# Patient Record
Sex: FEMALE | Race: WHITE | Employment: FULL TIME | URBAN - METROPOLITAN AREA
[De-identification: names, ages, dates, MRNs, and addresses within clinical notes are randomized per-mention and may not be internally consistent; named-entity substitution may affect disease eponyms.]

---

## 2017-03-21 ENCOUNTER — HOSPITAL ENCOUNTER (OUTPATIENT)
Age: 47
Discharge: HOME | End: 2017-03-21
Attending: EMERGENCY MEDICINE
Payer: COMMERCIAL

## 2017-03-21 VITALS
BODY MASS INDEX: 24 KG/M2 | RESPIRATION RATE: 18 BRPM | OXYGEN SATURATION: 99 % | TEMPERATURE: 98 F | HEART RATE: 71 BPM | WEIGHT: 136 LBS | SYSTOLIC BLOOD PRESSURE: 125 MMHG | DIASTOLIC BLOOD PRESSURE: 75 MMHG

## 2017-03-21 DIAGNOSIS — L03.019 ONYCHIA AND PARONYCHIA OF FINGER: ICD-10-CM

## 2017-03-21 DIAGNOSIS — L02.411 ABSCESS OF RIGHT AXILLA: Primary | ICD-10-CM

## 2017-03-21 PROCEDURE — 99204 OFFICE O/P NEW MOD 45 MIN: CPT

## 2017-03-21 PROCEDURE — 87205 SMEAR GRAM STAIN: CPT | Performed by: EMERGENCY MEDICINE

## 2017-03-21 PROCEDURE — 87077 CULTURE AEROBIC IDENTIFY: CPT | Performed by: EMERGENCY MEDICINE

## 2017-03-21 PROCEDURE — 99213 OFFICE O/P EST LOW 20 MIN: CPT

## 2017-03-21 PROCEDURE — 10061 I&D ABSCESS COMP/MULTIPLE: CPT

## 2017-03-21 PROCEDURE — 87070 CULTURE OTHR SPECIMN AEROBIC: CPT | Performed by: EMERGENCY MEDICINE

## 2017-03-21 PROCEDURE — 87186 SC STD MICRODIL/AGAR DIL: CPT | Performed by: EMERGENCY MEDICINE

## 2017-03-21 RX ORDER — CLINDAMYCIN HYDROCHLORIDE 300 MG/1
300 CAPSULE ORAL 4 TIMES DAILY
Qty: 28 CAPSULE | Refills: 0 | Status: SHIPPED | OUTPATIENT
Start: 2017-03-21 | End: 2017-03-28

## 2017-03-21 NOTE — ED INITIAL ASSESSMENT (HPI)
Pt with abscess under right arm. States present for one week but becoming more painful and increasing in size since yesterday. Denies fever. Pt with area of inflammation to index finger left hand.  States noticed yesterday

## 2017-03-21 NOTE — ED PROVIDER NOTES
Patient Seen in: [facility] In [address] [place] [address]    History   Patient presents with:  Abscess (integumentary)    Stated Complaint: abcess    HPI    38-year-old female without significant past medical history presents with complaints of incre lungs are clear to auscultation  Cardiac: regular rate and rhythm  Gastrointestinal: abdomen is soft and non tender, no masses, bowel sounds normal  Musculoskeletal: neck is supple and non tender         Extremities have full range of motion and are non te Medication List    START taking these medications    Clindamycin HCl 300 MG Oral Cap  Take 1 capsule (300 mg total) by mouth 4 (four) times daily.   Qty: 28 capsule Refills: 0

## 2017-03-23 ENCOUNTER — OFFICE VISIT (OUTPATIENT)
Dept: FAMILY MEDICINE CLINIC | Facility: CLINIC | Age: 47
End: 2017-03-23

## 2017-03-23 VITALS
DIASTOLIC BLOOD PRESSURE: 68 MMHG | WEIGHT: 134 LBS | HEIGHT: 63.5 IN | SYSTOLIC BLOOD PRESSURE: 109 MMHG | BODY MASS INDEX: 23.45 KG/M2 | TEMPERATURE: 98 F | HEART RATE: 74 BPM

## 2017-03-23 DIAGNOSIS — R32 URINARY INCONTINENCE, UNSPECIFIED TYPE: ICD-10-CM

## 2017-03-23 DIAGNOSIS — L02.411 ABSCESS OF RIGHT AXILLA: Primary | ICD-10-CM

## 2017-03-23 DIAGNOSIS — Z12.4 CERVICAL CANCER SCREENING: ICD-10-CM

## 2017-03-23 DIAGNOSIS — L03.012 PARONYCHIA OF LEFT INDEX FINGER: ICD-10-CM

## 2017-03-23 PROCEDURE — 99213 OFFICE O/P EST LOW 20 MIN: CPT | Performed by: FAMILY MEDICINE

## 2017-03-23 PROCEDURE — 99212 OFFICE O/P EST SF 10 MIN: CPT | Performed by: FAMILY MEDICINE

## 2017-03-23 NOTE — PROGRESS NOTES
Patient ID: Ryley Hurtado is a 48year old female.         ED Provider Notes by Perla Leiva MD at 3/21/2017  2:49 PM      Author: Perla Leiva MD Service: (none) Author Type: Physician     Filed: 3/21/2017  2:52 PM Note Time: 3/21/2017  2:49 P 111/73  07/30/12 : 112/74        Review of Systems   Constitutional: Negative for fever. Gastrointestinal: Negative for abdominal pain. Genitourinary: Negative for dysuria, urgency, frequency and hematuria.          Past Medical History   Diagnosis Date

## 2017-04-04 ENCOUNTER — OFFICE VISIT (OUTPATIENT)
Dept: FAMILY MEDICINE CLINIC | Facility: CLINIC | Age: 47
End: 2017-04-04

## 2017-04-04 VITALS
BODY MASS INDEX: 23.92 KG/M2 | HEIGHT: 63 IN | DIASTOLIC BLOOD PRESSURE: 71 MMHG | WEIGHT: 135 LBS | SYSTOLIC BLOOD PRESSURE: 107 MMHG | HEART RATE: 66 BPM

## 2017-04-04 DIAGNOSIS — Z12.31 VISIT FOR SCREENING MAMMOGRAM: ICD-10-CM

## 2017-04-04 DIAGNOSIS — Z00.00 ADULT GENERAL MEDICAL EXAM: Primary | ICD-10-CM

## 2017-04-04 DIAGNOSIS — D17.9 MULTIPLE LIPOMAS: ICD-10-CM

## 2017-04-04 PROCEDURE — 99396 PREV VISIT EST AGE 40-64: CPT | Performed by: FAMILY MEDICINE

## 2017-04-04 NOTE — PROGRESS NOTES
Patient ID: James Reveles is a 48year old female. HPI  Patient presents with:  Routine Physical    She does not smoke. She is . She works at [employer]. She has an appointment with her gynecologist on April 12, 2017.   Otherwise she states s Occupational History  None on file     Social History Main Topics   Smoking status: Never Smoker     Smokeless tobacco: Never Used    Alcohol Use: No    Drug Use: No    Sexual Activity: Not on file   Not on file  Other Topics Concern    Caffeine Concer lipomas  She has no pain with these and just wanted to mention these. Visit for screening mammogram  -     Mammo Screening BILATERAL; Future        Follow up if symptoms persist.  Take medicine (if given) as prescribed.   Approach to treatment discussed an

## 2017-04-10 ENCOUNTER — LAB ENCOUNTER (OUTPATIENT)
Dept: LAB | Age: 47
End: 2017-04-10
Attending: FAMILY MEDICINE
Payer: COMMERCIAL

## 2017-04-10 ENCOUNTER — HOSPITAL ENCOUNTER (OUTPATIENT)
Dept: MAMMOGRAPHY | Age: 47
Discharge: HOME | End: 2017-04-10
Attending: FAMILY MEDICINE
Payer: COMMERCIAL

## 2017-04-10 DIAGNOSIS — Z12.31 VISIT FOR SCREENING MAMMOGRAM: ICD-10-CM

## 2017-04-10 DIAGNOSIS — Z00.00 ADULT GENERAL MEDICAL EXAM: ICD-10-CM

## 2017-04-10 PROCEDURE — 80053 COMPREHEN METABOLIC PANEL: CPT

## 2017-04-10 PROCEDURE — 77067 SCR MAMMO BI INCL CAD: CPT

## 2017-04-10 PROCEDURE — 36415 COLL VENOUS BLD VENIPUNCTURE: CPT

## 2017-04-10 PROCEDURE — 80061 LIPID PANEL: CPT

## 2017-04-10 PROCEDURE — 85025 COMPLETE CBC W/AUTO DIFF WBC: CPT

## 2017-04-10 PROCEDURE — 84443 ASSAY THYROID STIM HORMONE: CPT

## 2017-04-12 ENCOUNTER — TELEPHONE (OUTPATIENT)
Dept: FAMILY MEDICINE CLINIC | Facility: CLINIC | Age: 47
End: 2017-04-12

## 2017-04-12 NOTE — TELEPHONE ENCOUNTER
Spoke with patient, results given, pt verbalized understanding . Copy of results mailed to home address.

## 2017-04-12 NOTE — TELEPHONE ENCOUNTER
----- Message from Wilfrid Garrido DO sent at 4/10/2017  7:49 PM CDT -----  Mammogram read as benign. My nurse will send you the report.

## 2017-04-14 NOTE — ED NOTES
Pt is calm. Pt states, \"I have good  News my son is being released in an hour\". Pt is tearful. Pt is cooperative. Ordered pt a meal tray. Pt resting in bed. Security at bedside. Will continue to monitor.

## 2017-04-14 NOTE — ED INITIAL ASSESSMENT (HPI)
Pt was picked up by her  in the car. They drove to her husbands work and the police were waiting to talk to them. Her son was arrested for selling marijuana. Pt had said she did not want to live anymore. Is here for evaluation.  Pt scratched her ante

## 2017-04-14 NOTE — BH PROGRESS NOTE
Assessed pt for safety in ED. According to police, pt become so upset what her son got in trouble with the law for marijuana that she thought that her \"life was over.  \" Pt reported that she did not know that they would talker so seriously and expressed r

## 2017-04-14 NOTE — BH LEVEL OF CARE ASSESSMENT
Level of Care Assessment Note        General Questions  Why are you here?: Pt is crying on the phone and unwilling to get off the phone. Pt is from [facility] and her accent is very thick.  She is reapeatedly saying \" all my life my lie, I feel like I am dyin By[de-identified] n/a  Past Suicidal Ideation: No  Past Suicide Plan: No  Past Suicide Intent: No  Past Suicide Rehearsal: No  Past Suicide Attempt: No  Past Suicide Risk Mitigating Factors: no hx of depression                                                            and she came here for the \"doctors\" since her son was sick and needed medical attentooon.  Today her son was arresed for having and possibly Awilda Miguel Ángel she broke down and was upset and started hysterically crying and commenting  that her life is

## 2017-04-14 NOTE — ED PROVIDER NOTES
Patient Seen in: [facility]    History   Patient presents with:  Eval-P (psychiatric)    Stated Complaint: psych eval    HPI    Patient is a 47-year-old female with no significant past medical history presents now with the abo 04/04/2017 (Approximate)        Physical Exam    Constitutional: Well-developed well-nourished in no acute distress  Head: Normocephalic, no swelling or tenderness  Eyes: Nonicteric sclera, no conjunctival injection  ENT: TMs are clear and flat bilaterally result                 Please view results for these tests on the individual orders.    RAINBOW DRAW BLUE   RAINBOW DRAW GOLD   RAINBOW DRAW LAVENDER   RAINBOW DRAW LIGHT GREEN   RAINBOW DRAW DARK GREEN   RAINBOW DRAW LAVENDER TALL (BNP)   CBC W/ DIFFERENTI

## 2017-04-14 NOTE — ED NOTES
Pt informed will be d/c home. Security provided belongings to patient. Pt left ER before d/c instructions could be given.

## 2017-07-20 ENCOUNTER — OFFICE VISIT (OUTPATIENT)
Dept: OBGYN CLINIC | Facility: CLINIC | Age: 47
End: 2017-07-20

## 2017-07-20 VITALS
HEART RATE: 88 BPM | BODY MASS INDEX: 21 KG/M2 | WEIGHT: 127 LBS | DIASTOLIC BLOOD PRESSURE: 62 MMHG | SYSTOLIC BLOOD PRESSURE: 94 MMHG

## 2017-07-20 DIAGNOSIS — Z01.411 ENCOUNTER FOR GYNECOLOGICAL EXAMINATION WITH ABNORMAL FINDING: Primary | ICD-10-CM

## 2017-07-20 DIAGNOSIS — R32 URINARY INCONTINENCE, UNSPECIFIED TYPE: ICD-10-CM

## 2017-07-20 PROCEDURE — 99212 OFFICE O/P EST SF 10 MIN: CPT | Performed by: OBSTETRICS & GYNECOLOGY

## 2017-07-20 PROCEDURE — 99386 PREV VISIT NEW AGE 40-64: CPT | Performed by: OBSTETRICS & GYNECOLOGY

## 2017-07-20 NOTE — PATIENT INSTRUCTIONS
Kegel Exercises  Kegel exercises don’t need special clothing or equipment. They’re easy to learn and simple to do. And if you do them right, no one can tell you’re doing them, so they can be done almost anywhere.  Your healthcare provider, nurse, or physi · Tighten your pelvic floor before you sneeze, get up from a chair, cough, laugh, or lift. This protects your pelvic floor from injury and can help prevent urine leakage.    Date Last Reviewed: 8/5/2015 © 2000-2016 The [address] [place] [address]

## 2017-07-20 NOTE — PROGRESS NOTES
Carlos Hayden is a 50year old female [id number] Patient's last menstrual period was 07/07/2017. Patient presents with:  Gyn Problem: URINARY INCONTINENCE per Dr. Bev Martinez --  never had pap. When walks, leaks urine -- issue in last 6 months.   Leaks w/ sneez denies shortness of breath  Gastrointestinal:  denies heartburn, abdominal pain, diarrhea or constipation  Genitourinary:  denies dysuria, incontinence, abnormal vaginal discharge, vaginal itching  Musculoskeletal:  denies back pain.   Skin/Breast:  Denies more anatomical issue like ?  Divert -- see urology for eval.

## 2017-07-22 LAB — HPV I/H RISK 1 DNA SPEC QL NAA+PROBE: NEGATIVE

## 2018-01-10 ENCOUNTER — HOSPITAL ENCOUNTER (OUTPATIENT)
Age: 48
Discharge: STILL ADMITTED OR ADMINISTRATIVE | End: 2018-01-10
Payer: COMMERCIAL

## 2018-01-11 NOTE — ED NOTES
Patient is requesting MD to bypass patients and see her now because she is on her lunch break.    Informed patient that due to census and single MD on site, discharge may not be possible prior to 7pm.  Discussed request with MD.  Provided patient with walk

## 2019-04-30 ENCOUNTER — TELEPHONE (OUTPATIENT)
Dept: OTHER | Age: 49
End: 2019-04-30

## 2019-04-30 ENCOUNTER — OFFICE VISIT (OUTPATIENT)
Dept: FAMILY MEDICINE CLINIC | Facility: CLINIC | Age: 49
End: 2019-04-30
Payer: COMMERCIAL

## 2019-04-30 VITALS
BODY MASS INDEX: 23.21 KG/M2 | DIASTOLIC BLOOD PRESSURE: 71 MMHG | HEART RATE: 104 BPM | SYSTOLIC BLOOD PRESSURE: 103 MMHG | WEIGHT: 131 LBS | TEMPERATURE: 98 F | HEIGHT: 63 IN

## 2019-04-30 DIAGNOSIS — K13.0 LESION OF LIP: ICD-10-CM

## 2019-04-30 DIAGNOSIS — L30.9 DERMATITIS: Primary | ICD-10-CM

## 2019-04-30 DIAGNOSIS — L98.9 NON-HEALING SKIN LESION OF NOSE: ICD-10-CM

## 2019-04-30 PROCEDURE — 99212 OFFICE O/P EST SF 10 MIN: CPT | Performed by: FAMILY MEDICINE

## 2019-04-30 PROCEDURE — 99214 OFFICE O/P EST MOD 30 MIN: CPT | Performed by: FAMILY MEDICINE

## 2019-04-30 RX ORDER — MUPIROCIN CALCIUM 20 MG/G
1 CREAM TOPICAL DAILY
Qty: 15 G | Refills: 0 | Status: SHIPPED | OUTPATIENT
Start: 2019-04-30 | End: 2019-05-14

## 2019-04-30 NOTE — TELEPHONE ENCOUNTER
Pt called and she stated that she speaks [place] language. With a  #[id number]. Pt stated that for the past 4 month she has been having some chap lips and itching and also on the chin. Pt saw the specialist and that doctor did not help her.  Only cl

## 2019-04-30 NOTE — PROGRESS NOTES
Patient ID: Chikis Silva is a 44year old female. HPI  Patient presents with:  Redness: on chin,lips and nose  Itching: chin,lips and nose  Does not smoke. Redness on the chin started January.  She reports that a  week ago the redness and drynes Neurological: Negative for speech difficulty. Psychiatric/Behavioral: The patient is not nervous/anxious.           Past Medical History:   Diagnosis Date   • Asthma due to environmental allergies        Past Surgical History:   Procedure Laterality Mal 14 days. Dermatology department was nice enough to get back to me to state they would give her a call to get her in sooner.   In the meantime we will start her on Bactroban cream.  Non-healing skin lesion of nose  -     Mupirocin Calcium 2 % External Cream

## 2019-05-08 ENCOUNTER — OFFICE VISIT (OUTPATIENT)
Dept: DERMATOLOGY CLINIC | Facility: CLINIC | Age: 49
End: 2019-05-08
Payer: COMMERCIAL

## 2019-05-08 DIAGNOSIS — L01.00 IMPETIGO: Primary | ICD-10-CM

## 2019-05-08 DIAGNOSIS — K13.0 LIP LESION: ICD-10-CM

## 2019-05-08 DIAGNOSIS — D23.9 BENIGN NEOPLASM OF SKIN, UNSPECIFIED LOCATION: ICD-10-CM

## 2019-05-08 PROCEDURE — 99212 OFFICE O/P EST SF 10 MIN: CPT | Performed by: DERMATOLOGY

## 2019-05-08 PROCEDURE — 99203 OFFICE O/P NEW LOW 30 MIN: CPT | Performed by: DERMATOLOGY

## 2019-05-08 RX ORDER — DOXYCYCLINE HYCLATE 50 MG/1
50 CAPSULE ORAL 2 TIMES DAILY
Qty: 60 CAPSULE | Refills: 12 | Status: SHIPPED | OUTPATIENT
Start: 2019-05-08 | End: 2020-04-28

## 2019-05-08 RX ORDER — VALACYCLOVIR HYDROCHLORIDE 1 G/1
TABLET, FILM COATED ORAL
Qty: 60 TABLET | Refills: 12 | Status: SHIPPED | OUTPATIENT
Start: 2019-05-08 | End: 2020-04-28

## 2019-05-19 NOTE — PROGRESS NOTES
Tenna Hodgkin is a 44year old female. HPI:     CC:  Patient presents with:  Lesion: New pt presenitng with lesion and redness to nose. c/o itching to nose. Pt denies personal and family hx of skin cancer.    Lesion: Pt also concerned with hyperpigment Days per week: Not on file        Minutes per session: Not on file      Stress: Not on file    Relationships      Social connections:        Talks on phone: Not on file        Gets together: Not on file        Attends Mosque service: Not on file inflamed. No particular seasonal variation currently using mupirocin help slightly. Has left discoloration on the lip. Notes more persistent redness on the nose. Has taken Xyzal without improvement.   Mupirocin helps once lesions are there does not help scarring. Inside lip superficial erosions resolving.   Nose with more diffuse redness few papular nodules, crusting yellowish cluster of erythematous herpetiform papules at ala    Assessment plan     probable impetiginized area nose inside at nares and lip

## 2019-07-03 ENCOUNTER — OFFICE VISIT (OUTPATIENT)
Dept: FAMILY MEDICINE CLINIC | Facility: CLINIC | Age: 49
End: 2019-07-03
Payer: COMMERCIAL

## 2019-07-03 VITALS
WEIGHT: 129 LBS | HEART RATE: 67 BPM | BODY MASS INDEX: 22.86 KG/M2 | SYSTOLIC BLOOD PRESSURE: 119 MMHG | DIASTOLIC BLOOD PRESSURE: 73 MMHG | HEIGHT: 63 IN

## 2019-07-03 DIAGNOSIS — N92.4 EXCESSIVE BLEEDING IN PREMENOPAUSAL PERIOD: ICD-10-CM

## 2019-07-03 DIAGNOSIS — J30.1 SEASONAL ALLERGIC RHINITIS DUE TO POLLEN: ICD-10-CM

## 2019-07-03 DIAGNOSIS — N95.1 PERIMENOPAUSE: ICD-10-CM

## 2019-07-03 DIAGNOSIS — N39.45 CONTINUOUS LEAKAGE OF URINE: Primary | ICD-10-CM

## 2019-07-03 DIAGNOSIS — Z00.00 WELL ADULT EXAM: ICD-10-CM

## 2019-07-03 DIAGNOSIS — Z12.11 SCREENING FOR COLON CANCER: ICD-10-CM

## 2019-07-03 DIAGNOSIS — Z12.31 SCREENING MAMMOGRAM, ENCOUNTER FOR: ICD-10-CM

## 2019-07-03 PROCEDURE — 99396 PREV VISIT EST AGE 40-64: CPT | Performed by: NURSE PRACTITIONER

## 2019-07-03 NOTE — PATIENT INSTRUCTIONS
Treating Incontinence in Women: Special Therapies     During biofeedback, sensors send signals from your pelvic floor muscles to a computer screen. Your doctor will discuss your options for treating your urinary incontinence.  These depend on the caus © 4722-4071 The [address]. [address] [facility], [address] [place]. All rights reserved. This information is not intended as a substitute for professional medical care. Always follow your healthcare professional's instructions.         Prevent All women should be familiar with the potential benefits and risks of breast cancer screening with mammograms.      Cervical cancer All women in this age group, except women who have had a complete hysterectomy Pap test every 3 years or Pap test with human Hepatitis A Women at increased risk for infection – talk with your healthcare provider 2 doses given at least 6 months apart   Hepatitis B Women at increased risk for infection – talk with your healthcare provider 3 doses over 6 months; second dose should Use of daily aspirin Women ages 54 and up in this age group who are at risk for cardiovascular health problems such as stroke When your risk is known   Use of tobacco and the health effects it can cause All women in this age group Every exam   1Amerjeremias Ca · Decongestant pills and sprays reduce tissue swelling and watery discharge. Overuse of nasal decongestant sprays may make symptoms worse.  Do not use these more often than recommended. Sometimes you can experience a rebound effect (symptoms worsen), when s · You have asthma and your asthma symptoms do not respond to the usual doses of your medicine  · Cough with colored sputum (mucus)  · Fever of 100.4°F (38°C) or higher, or as directed by the healthcare provider  Call 911  Call 911 if any of these occur:  · · Urge incontinence (also called overactive bladder). With this type, a sudden urge to urinate is felt often. This may happen even though there may not be much urine in the bladder. The need to urinate often during the night is common.  Urge incontinence mo · Quit smoking. Smoking and other tobacco use can lead to chronic cough that strains the pelvic floor muscles. Smoking may also damage the bladder and urethra. Talk with your provider about treatments or methods you can use to quit smoking.   · If drinking Menopause is when you stop having periods for good. For many women, this happens around age 48. The time of change before this is called perimenopause. It may start in your late 30s or 40s. It can last for months or years.  During this time, your body makes · Urinary symptoms including frequency and incontinence  Medicines that may help  Some medicines can help ease the effects of perimenopause. These include:  · Low-dose birth control pills. These often contain both estrogen and progesterone.  They can help r

## 2019-07-03 NOTE — PROGRESS NOTES
HPI  Pt presents with incontinence of urine-constant leaking of urine, soaking pads. Periods are worsening, lasting longer with clots and cramping. Sometimes will get period 2x in a month.      Review of Systems   Constitutional: Positive for activity [place] week: Not on file        Minutes per session: Not on file      Stress: Not on file    Relationships      Social connections:        Talks on phone: Not on file        Gets together: Not on file        Attends Islam service: Not on file        Active me present  Mouth/Throat: Posterior oropharyngeal erythema present. Pale, boggy turbinates bilaterally; clear rhinorrhea present     Eyes: Pupils are equal, round, and reactive to light. Conjunctivae and EOM are normal. Right eye exhibits no discharge.  Left Orders    [facility] TENISHA 2D+3D SCREENING BILAT (CPT=77067/23088)                      Discussed plan of care with pt and pt is in agreement. All questions answered. Pt to call with questions or concerns.     Encouraged to sign up for My Chart if not already regist

## 2020-02-04 ENCOUNTER — OFFICE VISIT (OUTPATIENT)
Dept: OBGYN CLINIC | Facility: CLINIC | Age: 50
End: 2020-02-04
Payer: COMMERCIAL

## 2020-02-04 VITALS
HEART RATE: 72 BPM | DIASTOLIC BLOOD PRESSURE: 70 MMHG | SYSTOLIC BLOOD PRESSURE: 106 MMHG | BODY MASS INDEX: 23 KG/M2 | WEIGHT: 128 LBS

## 2020-02-04 DIAGNOSIS — Z12.31 VISIT FOR SCREENING MAMMOGRAM: ICD-10-CM

## 2020-02-04 DIAGNOSIS — N93.9 ABNORMAL VAGINAL BLEEDING: Primary | ICD-10-CM

## 2020-02-04 PROCEDURE — 99214 OFFICE O/P EST MOD 30 MIN: CPT | Performed by: OBSTETRICS & GYNECOLOGY

## 2020-02-05 NOTE — PROGRESS NOTES
Joshua Romo is a 44year old female [id number] Patient's last menstrual period was 01/20/2020.  Patient presents with:  Menstrual Problem: Whole month of Dec w/ spotting -- normal period then in Jan x 7-8 days -- prior period back in Oct (+) hot flashes Not on file        Gets together: Not on file        Attends Mandaen service: Not on file        Active member of club or organization: Not on file        Attends meetings of clubs or organizations: Not on file        Relationship status: Not on file pain.  Skin/Breast:    denies any breast pain, lumps, or discharge. Neurological:    denies headaches, extremity weakness or numbness. Psychiatric:   denies depression or anxiety. Endocrine:     denies excessive thirst or urination.   Heme/Lymph:    den

## 2020-04-25 ENCOUNTER — NURSE TRIAGE (OUTPATIENT)
Dept: FAMILY MEDICINE CLINIC | Facility: CLINIC | Age: 50
End: 2020-04-25

## 2020-04-25 NOTE — TELEPHONE ENCOUNTER
Action Requested: Summary for Provider     []  Critical Lab, Recommendations Needed  [] Need Additional Advice  []   FYI    []   Need Orders  [] Need Medications Sent to Pharmacy  []  Other     SUMMARY:Pt requesting Appt- c/c head ache, hand pain, body blayne

## 2020-04-25 NOTE — TELEPHONE ENCOUNTER
Pt contacted. Informed of Dr. Vinh Jeter message. She verbalized understanding and schedule appointment for Tuesday morning.     Future Appointments   Date Time Provider Leobardo Ambrocio   4/28/2020  8:40 AM DO DARCI Garcia  JULIOO

## 2020-04-25 NOTE — TELEPHONE ENCOUNTER
She is a nurse. If this headache started 1 month ago I doubt this is COVID 19.   If she wants to be seen and does not have a fever  go ahead and set up for an appointment on Monday or Tuesday but please make sure she can get here before 3 PM on Monday or 2

## 2020-04-28 ENCOUNTER — LAB ENCOUNTER (OUTPATIENT)
Dept: LAB | Age: 50
End: 2020-04-28
Attending: FAMILY MEDICINE
Payer: COMMERCIAL

## 2020-04-28 ENCOUNTER — OFFICE VISIT (OUTPATIENT)
Dept: FAMILY MEDICINE CLINIC | Facility: CLINIC | Age: 50
End: 2020-04-28
Payer: COMMERCIAL

## 2020-04-28 VITALS
HEIGHT: 63 IN | HEART RATE: 73 BPM | DIASTOLIC BLOOD PRESSURE: 71 MMHG | WEIGHT: 127.63 LBS | TEMPERATURE: 99 F | BODY MASS INDEX: 22.61 KG/M2 | SYSTOLIC BLOOD PRESSURE: 104 MMHG

## 2020-04-28 DIAGNOSIS — R51.9 HEADACHE, UNSPECIFIED HEADACHE TYPE: ICD-10-CM

## 2020-04-28 DIAGNOSIS — M79.10 MYALGIA: ICD-10-CM

## 2020-04-28 DIAGNOSIS — R51.9 HEADACHE, UNSPECIFIED HEADACHE TYPE: Primary | ICD-10-CM

## 2020-04-28 DIAGNOSIS — R53.1 GENERALIZED WEAKNESS: ICD-10-CM

## 2020-04-28 PROCEDURE — 84443 ASSAY THYROID STIM HORMONE: CPT

## 2020-04-28 PROCEDURE — 86200 CCP ANTIBODY: CPT

## 2020-04-28 PROCEDURE — 82607 VITAMIN B-12: CPT

## 2020-04-28 PROCEDURE — 85025 COMPLETE CBC W/AUTO DIFF WBC: CPT

## 2020-04-28 PROCEDURE — 99214 OFFICE O/P EST MOD 30 MIN: CPT | Performed by: FAMILY MEDICINE

## 2020-04-28 PROCEDURE — 85652 RBC SED RATE AUTOMATED: CPT

## 2020-04-28 PROCEDURE — 84165 PROTEIN E-PHORESIS SERUM: CPT

## 2020-04-28 PROCEDURE — 80053 COMPREHEN METABOLIC PANEL: CPT

## 2020-04-28 PROCEDURE — 86235 NUCLEAR ANTIGEN ANTIBODY: CPT

## 2020-04-28 PROCEDURE — 86225 DNA ANTIBODY NATIVE: CPT

## 2020-04-28 PROCEDURE — 86160 COMPLEMENT ANTIGEN: CPT

## 2020-04-28 PROCEDURE — 86140 C-REACTIVE PROTEIN: CPT

## 2020-04-28 PROCEDURE — 36415 COLL VENOUS BLD VENIPUNCTURE: CPT

## 2020-04-28 PROCEDURE — 86431 RHEUMATOID FACTOR QUANT: CPT

## 2020-04-28 NOTE — PROGRESS NOTES
Patient ID: Susanne Crawley is a 44year old female.     HPI  Patient presents with:  Headache: x 1 month  Body ache and/or chills: body ache x 1 month    Telephone message from 4/25/20:  Randy Cuevas RN   Registered Nurse      Telephone Encounter   Si coughing. She does not feel sick. She does not feel her pain is due to her work. She was working 2 jobs initially and states her current workload is lighter as she is only working one job. Her mother does not have rheumatoid arthritis.   Her family is fr Medications   Medication Sig Dispense Refill   • Cetirizine HCl (ZYRTEC ALLERGY) 10 MG Oral Cap Take by mouth.        Allergies:No Known Allergies     Physical Exam:       Physical Exam   Physical Exam   Constitutional: Patient is oriented to person, place, THYROID STIM HORMONE; Future  -     VITAMIN B12; Future  Speaking clearly. No neurologic deficits at all. No synovitis of the musculature. Lets go ahead and do labs.   I offered her medication for discomfort but she defers and states she would rather jus Billie Rodrigues DO. Electronically Signed: Ellen Ruiz, 4/28/2020, 8:54 AM.    I, Billie Rodrigues DO,  personally performed the services described in this documentation.  All medical record entries made by the scribe were at my direction and in my prese

## 2020-05-12 ENCOUNTER — LAB ENCOUNTER (OUTPATIENT)
Dept: LAB | Age: 50
End: 2020-05-12
Attending: FAMILY MEDICINE
Payer: COMMERCIAL

## 2020-05-12 DIAGNOSIS — D72.819 LEUKOPENIA, UNSPECIFIED TYPE: ICD-10-CM

## 2020-05-12 PROCEDURE — 85025 COMPLETE CBC W/AUTO DIFF WBC: CPT

## 2020-05-12 PROCEDURE — 36415 COLL VENOUS BLD VENIPUNCTURE: CPT

## 2020-05-19 ENCOUNTER — LAB ENCOUNTER (OUTPATIENT)
Dept: LAB | Age: 50
End: 2020-05-19
Attending: FAMILY MEDICINE
Payer: COMMERCIAL

## 2020-05-19 ENCOUNTER — OFFICE VISIT (OUTPATIENT)
Dept: FAMILY MEDICINE CLINIC | Facility: CLINIC | Age: 50
End: 2020-05-19
Payer: COMMERCIAL

## 2020-05-19 ENCOUNTER — HOSPITAL ENCOUNTER (OUTPATIENT)
Dept: GENERAL RADIOLOGY | Age: 50
Discharge: HOME | End: 2020-05-19
Attending: FAMILY MEDICINE
Payer: COMMERCIAL

## 2020-05-19 VITALS
WEIGHT: 125 LBS | DIASTOLIC BLOOD PRESSURE: 69 MMHG | TEMPERATURE: 97 F | HEIGHT: 63 IN | BODY MASS INDEX: 22.15 KG/M2 | HEART RATE: 64 BPM | SYSTOLIC BLOOD PRESSURE: 101 MMHG

## 2020-05-19 DIAGNOSIS — R53.83 LETHARGY: ICD-10-CM

## 2020-05-19 DIAGNOSIS — M25.521 PAIN OF BOTH ELBOWS: ICD-10-CM

## 2020-05-19 DIAGNOSIS — D72.819 LEUKOPENIA, UNSPECIFIED TYPE: Primary | ICD-10-CM

## 2020-05-19 DIAGNOSIS — M25.522 PAIN OF BOTH ELBOWS: ICD-10-CM

## 2020-05-19 DIAGNOSIS — D72.819 LEUKOPENIA, UNSPECIFIED TYPE: ICD-10-CM

## 2020-05-19 PROCEDURE — 99214 OFFICE O/P EST MOD 30 MIN: CPT | Performed by: FAMILY MEDICINE

## 2020-05-19 PROCEDURE — 85025 COMPLETE CBC W/AUTO DIFF WBC: CPT

## 2020-05-19 PROCEDURE — 71046 X-RAY EXAM CHEST 2 VIEWS: CPT | Performed by: FAMILY MEDICINE

## 2020-05-19 PROCEDURE — 3078F DIAST BP <80 MM HG: CPT | Performed by: FAMILY MEDICINE

## 2020-05-19 PROCEDURE — 86664 EPSTEIN-BARR NUCLEAR ANTIGEN: CPT

## 2020-05-19 PROCEDURE — 86665 EPSTEIN-BARR CAPSID VCA: CPT

## 2020-05-19 PROCEDURE — 36415 COLL VENOUS BLD VENIPUNCTURE: CPT

## 2020-05-19 PROCEDURE — 86308 HETEROPHILE ANTIBODY SCREEN: CPT

## 2020-05-19 PROCEDURE — 3074F SYST BP LT 130 MM HG: CPT | Performed by: FAMILY MEDICINE

## 2020-05-19 PROCEDURE — 3008F BODY MASS INDEX DOCD: CPT | Performed by: FAMILY MEDICINE

## 2020-05-19 NOTE — PROGRESS NOTES
Patient ID: Braden Ruby is a 44year old female. HPI  Patient presents with:  Lab Results: from 5/12/20    Last seen by me on 4/28/20. She states her headaches are much better but she feels very tired.  She has difficulty waking up early and ha ALB 3.3 (L) 04/28/2020    ALB 3.88 04/28/2020    GLOBULIN 3.8 04/28/2020    AGRATIO 1.3 08/06/2012     04/28/2020    K 4.0 04/28/2020     04/28/2020    CO2 25.0 04/28/2020       Lab Results   Component Value Date    GLU 93 04/28/2020    BUN 20 94/62        Review of Systems   Constitutional: Positive for fatigue. Negative for chills and fever. HENT: Negative for voice change. Respiratory: Negative for cough, chest tightness and shortness of breath.     Cardiovascular: Negative for chest pain using her hands to get her up. Vitals reviewed. Blood pressure 101/69, pulse 64, temperature 97.4 °F (36.3 °C), temperature source Oral, height 5' 3\" (1.6 m), weight 125 lb (56.7 kg), not currently breastfeeding.            Assessment/Plan:      Diag Theresa Ramos DO. Electronically Signed: Yue Mendez, 5/19/2020, 10:48 AM.    I, Theresa Ramos DO,  personally performed the services described in this documentation.  All medical record entries made by the scribe were at my direction and in my pres

## 2020-12-22 ENCOUNTER — VIRTUAL PHONE E/M (OUTPATIENT)
Dept: FAMILY MEDICINE CLINIC | Facility: CLINIC | Age: 50
End: 2020-12-22
Payer: COMMERCIAL

## 2020-12-22 ENCOUNTER — LAB ENCOUNTER (OUTPATIENT)
Dept: LAB | Age: 50
End: 2020-12-22
Attending: PHYSICIAN ASSISTANT
Payer: COMMERCIAL

## 2020-12-22 DIAGNOSIS — Z20.822 SUSPECTED COVID-19 VIRUS INFECTION: Primary | ICD-10-CM

## 2020-12-22 DIAGNOSIS — Z20.822 SUSPECTED COVID-19 VIRUS INFECTION: ICD-10-CM

## 2020-12-22 PROCEDURE — 99213 OFFICE O/P EST LOW 20 MIN: CPT | Performed by: PHYSICIAN ASSISTANT

## 2020-12-22 NOTE — PROGRESS NOTES
Please note that the following visit was completed using two-way, real-time interactive audio and/or video communication.   This has been done in good james to provide continuity of care in the best interest of the provider-patient relationship, due to the Vitals signs taken at home if available:    Limited examination for this telephone visit due to the coronavirus emergency    Patient was speaking in complete sentences, no increased work of breathing and very coherent and alert on the phone.   Alert and

## 2021-05-13 ENCOUNTER — TELEPHONE (OUTPATIENT)
Dept: FAMILY MEDICINE CLINIC | Facility: CLINIC | Age: 51
End: 2021-05-13

## 2021-09-21 ENCOUNTER — HOSPITAL ENCOUNTER (OUTPATIENT)
Dept: GENERAL RADIOLOGY | Age: 51
Discharge: HOME | End: 2021-09-21
Attending: FAMILY MEDICINE
Payer: COMMERCIAL

## 2021-09-21 ENCOUNTER — OFFICE VISIT (OUTPATIENT)
Dept: FAMILY MEDICINE CLINIC | Facility: CLINIC | Age: 51
End: 2021-09-21
Payer: COMMERCIAL

## 2021-09-21 VITALS
DIASTOLIC BLOOD PRESSURE: 70 MMHG | HEART RATE: 71 BPM | TEMPERATURE: 99 F | HEIGHT: 63 IN | BODY MASS INDEX: 22.26 KG/M2 | SYSTOLIC BLOOD PRESSURE: 105 MMHG | WEIGHT: 125.63 LBS

## 2021-09-21 DIAGNOSIS — R14.2 ERUCTATION: ICD-10-CM

## 2021-09-21 DIAGNOSIS — G44.209 TENSION HEADACHE: ICD-10-CM

## 2021-09-21 DIAGNOSIS — G89.29 CHRONIC MIDLINE LOW BACK PAIN WITHOUT SCIATICA: ICD-10-CM

## 2021-09-21 DIAGNOSIS — M54.50 CHRONIC MIDLINE LOW BACK PAIN WITHOUT SCIATICA: ICD-10-CM

## 2021-09-21 DIAGNOSIS — Z23 NEED FOR VACCINATION: ICD-10-CM

## 2021-09-21 DIAGNOSIS — M47.816 ARTHRITIS, LUMBAR SPINE: ICD-10-CM

## 2021-09-21 DIAGNOSIS — R10.84 GENERALIZED ABDOMINAL PAIN: Primary | ICD-10-CM

## 2021-09-21 DIAGNOSIS — R11.0 NAUSEA: ICD-10-CM

## 2021-09-21 DIAGNOSIS — R14.3 FLATULENCE SYMPTOM: ICD-10-CM

## 2021-09-21 DIAGNOSIS — Z12.11 SCREENING FOR COLON CANCER: ICD-10-CM

## 2021-09-21 PROCEDURE — 90715 TDAP VACCINE 7 YRS/> IM: CPT | Performed by: FAMILY MEDICINE

## 2021-09-21 PROCEDURE — 3074F SYST BP LT 130 MM HG: CPT | Performed by: FAMILY MEDICINE

## 2021-09-21 PROCEDURE — 90471 IMMUNIZATION ADMIN: CPT | Performed by: FAMILY MEDICINE

## 2021-09-21 PROCEDURE — 3008F BODY MASS INDEX DOCD: CPT | Performed by: FAMILY MEDICINE

## 2021-09-21 PROCEDURE — 99215 OFFICE O/P EST HI 40 MIN: CPT | Performed by: FAMILY MEDICINE

## 2021-09-21 PROCEDURE — 3078F DIAST BP <80 MM HG: CPT | Performed by: FAMILY MEDICINE

## 2021-09-21 PROCEDURE — 72110 X-RAY EXAM L-2 SPINE 4/>VWS: CPT | Performed by: FAMILY MEDICINE

## 2021-09-21 RX ORDER — DICYCLOMINE HYDROCHLORIDE 10 MG/1
10 CAPSULE ORAL 4 TIMES DAILY
Qty: 40 CAPSULE | Refills: 3 | Status: SHIPPED | OUTPATIENT
Start: 2021-09-21 | End: 2021-10-01

## 2021-09-21 RX ORDER — CYCLOBENZAPRINE HCL 10 MG
10 TABLET ORAL NIGHTLY
Qty: 30 TABLET | Refills: 0 | Status: SHIPPED | OUTPATIENT
Start: 2021-09-21 | End: 2021-10-21

## 2021-09-21 RX ORDER — PANTOPRAZOLE SODIUM 40 MG/1
40 TABLET, DELAYED RELEASE ORAL
Qty: 90 TABLET | Refills: 1 | Status: SHIPPED | OUTPATIENT
Start: 2021-09-21 | End: 2022-09-21

## 2021-09-21 NOTE — PROGRESS NOTES
Patient ID: Cole Sparks is a 46year old female. HPI  Patient presents with:  Stomach Pain: x 2 months  Diarrhea    Last seen by me on 5/19/2020. Pt works at [employer]. Pt c/o abdominal pain x10 days.  Pt states she is having too much gas an due to environmental allergies        Past Surgical History:   Procedure Laterality Date   •      • OTHER SURGICAL HISTORY      excision of thigh lipoma          No current outpatient medications on file.      Allergies:No Known Allergies     Physic EC; Take 1 tablet (40 mg total) by mouth every morning before breakfast. To help with stomach acid and stomach irritation/inflammation  -     dicyclomine 10 MG Oral Cap; Take 1 capsule (10 mg total) by mouth 4 (four) times daily for 10 days.  (as needed for see you ++++. Referral Priority:Routine          Referral Type:OFFICE VISIT          Requested Specialty:GASTROENTEROLOGY          Number of Visits Requested:3      Follow up if symptoms persist.  Take medicine (if given) as prescribed.   Approach

## 2021-11-03 ENCOUNTER — HOSPITAL ENCOUNTER (OUTPATIENT)
Age: 51
Discharge: HOME | End: 2021-11-03
Payer: COMMERCIAL

## 2021-11-03 VITALS
RESPIRATION RATE: 18 BRPM | HEIGHT: 62 IN | HEART RATE: 68 BPM | DIASTOLIC BLOOD PRESSURE: 73 MMHG | WEIGHT: 135 LBS | TEMPERATURE: 98 F | OXYGEN SATURATION: 99 % | BODY MASS INDEX: 24.84 KG/M2 | SYSTOLIC BLOOD PRESSURE: 123 MMHG

## 2021-11-03 DIAGNOSIS — R09.1 PLEURISY: Primary | ICD-10-CM

## 2021-11-03 PROCEDURE — 99213 OFFICE O/P EST LOW 20 MIN: CPT | Performed by: NURSE PRACTITIONER

## 2021-11-03 RX ORDER — IBUPROFEN 600 MG/1
600 TABLET ORAL EVERY 8 HOURS PRN
Qty: 30 TABLET | Refills: 0 | Status: SHIPPED | OUTPATIENT
Start: 2021-11-03 | End: 2021-11-10

## 2021-11-03 RX ORDER — CYCLOBENZAPRINE HCL 10 MG
10 TABLET ORAL 3 TIMES DAILY PRN
Qty: 20 TABLET | Refills: 0 | Status: SHIPPED | OUTPATIENT
Start: 2021-11-03 | End: 2021-11-10

## 2021-11-03 NOTE — ED INITIAL ASSESSMENT (HPI)
Pt complaining of left lower rib pain since yesterday. Pt states pain with movement especially bending over. Pt states pain is worsening today.

## 2021-11-13 NOTE — ED PROVIDER NOTES
Patient Seen in: [facility]      History   Patient presents with:  Trauma    Stated Complaint: CHEST PAIN X 1 DAY, SHORTNESS OF BREATH    Subjective:   HPI    45 yo female arrives tot he ic with co ant l distal rib pain, worse with movement a Normocephalic and atraumatic.       Right Ear: External ear normal.      Left Ear: External ear normal.      Nose: Nose normal.      Mouth/Throat:      Mouth: Mucous membranes are moist.      Pharynx: No oropharyngeal exudate or posterior oropharyngeal eryt plan, also including, if needed, prescription drug management and or OTC drug management.      I spent a total of 19 minutes during chart review, obtaining history, performing a physical exam, bedside monitoring of interventions, collecting and independentl needed for Muscle spasms. , Normal, Disp-20 tablet, R-0

## 2022-06-21 ENCOUNTER — OFFICE VISIT (OUTPATIENT)
Dept: GASTROENTEROLOGY | Facility: CLINIC | Age: 52
End: 2022-06-21
Payer: COMMERCIAL

## 2022-06-21 ENCOUNTER — TELEPHONE (OUTPATIENT)
Dept: GASTROENTEROLOGY | Facility: CLINIC | Age: 52
End: 2022-06-21

## 2022-06-21 VITALS
HEART RATE: 74 BPM | BODY MASS INDEX: 23 KG/M2 | HEIGHT: 62 IN | WEIGHT: 125 LBS | DIASTOLIC BLOOD PRESSURE: 64 MMHG | SYSTOLIC BLOOD PRESSURE: 100 MMHG

## 2022-06-21 DIAGNOSIS — Z12.11 COLON CANCER SCREENING: ICD-10-CM

## 2022-06-21 DIAGNOSIS — Z12.11 SCREEN FOR COLON CANCER: Primary | ICD-10-CM

## 2022-06-21 DIAGNOSIS — R10.13 DYSPEPSIA: ICD-10-CM

## 2022-06-21 DIAGNOSIS — K59.00 CONSTIPATION, UNSPECIFIED CONSTIPATION TYPE: Primary | ICD-10-CM

## 2022-06-21 PROCEDURE — 99244 OFF/OP CNSLTJ NEW/EST MOD 40: CPT | Performed by: REGISTERED NURSE

## 2022-06-21 PROCEDURE — 3008F BODY MASS INDEX DOCD: CPT | Performed by: REGISTERED NURSE

## 2022-06-21 PROCEDURE — 3074F SYST BP LT 130 MM HG: CPT | Performed by: REGISTERED NURSE

## 2022-06-21 PROCEDURE — 3078F DIAST BP <80 MM HG: CPT | Performed by: REGISTERED NURSE

## 2022-06-21 RX ORDER — POLYETHYLENE GLYCOL 3350, SODIUM CHLORIDE, SODIUM BICARBONATE, POTASSIUM CHLORIDE 420; 11.2; 5.72; 1.48 G/4L; G/4L; G/4L; G/4L
POWDER, FOR SOLUTION ORAL
Qty: 4000 ML | Refills: 0 | Status: SHIPPED | OUTPATIENT
Start: 2022-06-21

## 2022-06-21 NOTE — TELEPHONE ENCOUNTER
Scheduled for:  Colonoscopy [address]43  Provider Name:  Dr. Jacob Leon  Date:  7/14/2022  Location:  [address] [place] [address] 1  Sedation:  MAC  Time:  3:00 pm, arrival 2:00 pm  Prep:  Golytely  Meds/Allergies Reconciled?:  Alyson/APN reviewed. Diagnosis with codes:  Screening for colon cancer Z12.11  Was patient informed to call insurance with codes (Y/N):  Yes  Referral sent?:  Referral was sent at the time of electronic surgical scheduling. [facility] or [address] [place] [address] notified?:  I sent an electronic request to Endo Scheduling and received a confirmation today. Medication Orders:  N/A  Misc Orders:  N/A     Further instructions given by staff:  I provided patient with prep instruction sheet.

## 2022-07-07 ENCOUNTER — LAB ENCOUNTER (OUTPATIENT)
Dept: LAB | Age: 52
End: 2022-07-07
Attending: REGISTERED NURSE
Payer: COMMERCIAL

## 2022-07-07 DIAGNOSIS — K59.00 CONSTIPATION, UNSPECIFIED CONSTIPATION TYPE: ICD-10-CM

## 2022-07-07 LAB
ALBUMIN SERPL-MCNC: 3.4 G/DL (ref 3.4–5)
ALBUMIN/GLOB SERPL: 0.9 {RATIO} (ref 1–2)
ALP LIVER SERPL-CCNC: 70 U/L
ALT SERPL-CCNC: 21 U/L
ANION GAP SERPL CALC-SCNC: 4 MMOL/L (ref 0–18)
AST SERPL-CCNC: 20 U/L (ref 15–37)
BILIRUB SERPL-MCNC: 0.3 MG/DL (ref 0.1–2)
BUN BLD-MCNC: 15 MG/DL (ref 7–18)
BUN/CREAT SERPL: 26.3 (ref 10–20)
CALCIUM BLD-MCNC: 8.6 MG/DL (ref 8.5–10.1)
CHLORIDE SERPL-SCNC: 108 MMOL/L (ref 98–112)
CO2 SERPL-SCNC: 27 MMOL/L (ref 21–32)
CREAT BLD-MCNC: 0.57 MG/DL
FASTING STATUS PATIENT QL REPORTED: YES
GLOBULIN PLAS-MCNC: 3.6 G/DL (ref 2.8–4.4)
GLUCOSE BLD-MCNC: 92 MG/DL (ref 70–99)
OSMOLALITY SERPL CALC.SUM OF ELEC: 288 MOSM/KG (ref 275–295)
POTASSIUM SERPL-SCNC: 3.8 MMOL/L (ref 3.5–5.1)
PROT SERPL-MCNC: 7 G/DL (ref 6.4–8.2)
SODIUM SERPL-SCNC: 139 MMOL/L (ref 136–145)
T4 FREE SERPL-MCNC: 0.9 NG/DL (ref 0.8–1.7)
TSI SER-ACNC: 4.18 MIU/ML (ref 0.36–3.74)

## 2022-07-07 PROCEDURE — 36415 COLL VENOUS BLD VENIPUNCTURE: CPT

## 2022-07-07 PROCEDURE — 84439 ASSAY OF FREE THYROXINE: CPT

## 2022-07-07 PROCEDURE — 84443 ASSAY THYROID STIM HORMONE: CPT

## 2022-07-07 PROCEDURE — 80053 COMPREHEN METABOLIC PANEL: CPT

## 2022-07-08 ENCOUNTER — TELEPHONE (OUTPATIENT)
Dept: GASTROENTEROLOGY | Facility: CLINIC | Age: 52
End: 2022-07-08

## 2022-07-11 ENCOUNTER — LAB ENCOUNTER (OUTPATIENT)
Dept: LAB | Age: 52
End: 2022-07-11
Attending: REGISTERED NURSE
Payer: COMMERCIAL

## 2022-07-11 DIAGNOSIS — R10.13 DYSPEPSIA: ICD-10-CM

## 2022-07-11 PROCEDURE — 87338 HPYLORI STOOL AG IA: CPT

## 2022-07-13 LAB — HELICOBACTER PYLORI AG, FECAL: NEGATIVE

## 2022-07-14 ENCOUNTER — ANESTHESIA (OUTPATIENT)
Dept: ENDOSCOPY | Age: 52
End: 2022-07-14
Payer: COMMERCIAL

## 2022-07-14 ENCOUNTER — HOSPITAL ENCOUNTER (OUTPATIENT)
Age: 52
Setting detail: HOSPITAL OUTPATIENT SURGERY
Discharge: HOME | End: 2022-07-14
Attending: INTERNAL MEDICINE | Admitting: INTERNAL MEDICINE
Payer: COMMERCIAL

## 2022-07-14 ENCOUNTER — ANESTHESIA EVENT (OUTPATIENT)
Dept: ENDOSCOPY | Age: 52
End: 2022-07-14
Payer: COMMERCIAL

## 2022-07-14 VITALS
RESPIRATION RATE: 14 BRPM | BODY MASS INDEX: 23 KG/M2 | WEIGHT: 125 LBS | HEIGHT: 62 IN | DIASTOLIC BLOOD PRESSURE: 78 MMHG | SYSTOLIC BLOOD PRESSURE: 114 MMHG | HEART RATE: 64 BPM | OXYGEN SATURATION: 99 %

## 2022-07-14 DIAGNOSIS — Z12.11 SCREEN FOR COLON CANCER: ICD-10-CM

## 2022-07-14 PROCEDURE — 99070 SPECIAL SUPPLIES PHYS/QHP: CPT | Performed by: INTERNAL MEDICINE

## 2022-07-14 PROCEDURE — 45380 COLONOSCOPY AND BIOPSY: CPT | Performed by: INTERNAL MEDICINE

## 2022-07-14 RX ORDER — SODIUM CHLORIDE, SODIUM LACTATE, POTASSIUM CHLORIDE, CALCIUM CHLORIDE 600; 310; 30; 20 MG/100ML; MG/100ML; MG/100ML; MG/100ML
INJECTION, SOLUTION INTRAVENOUS CONTINUOUS
Status: DISCONTINUED | OUTPATIENT
Start: 2022-07-14 | End: 2022-07-14

## 2022-07-14 RX ORDER — SODIUM CHLORIDE, SODIUM LACTATE, POTASSIUM CHLORIDE, CALCIUM CHLORIDE 600; 310; 30; 20 MG/100ML; MG/100ML; MG/100ML; MG/100ML
INJECTION, SOLUTION INTRAVENOUS CONTINUOUS PRN
Status: DISCONTINUED | OUTPATIENT
Start: 2022-07-14 | End: 2022-07-14 | Stop reason: SURG

## 2022-07-14 RX ADMIN — SODIUM CHLORIDE, SODIUM LACTATE, POTASSIUM CHLORIDE, CALCIUM CHLORIDE: 600; 310; 30; 20 INJECTION, SOLUTION INTRAVENOUS at 15:14:00

## 2022-07-14 NOTE — ANESTHESIA POSTPROCEDURE EVALUATION
Patient: Justina Mae    Procedure Summary     Date: 07/14/22 Room / Location: [facility] ENDOSCOPY 01 / [facility] ENDO    Anesthesia Start: 1312 Anesthesia Stop: 1730    Procedure: COLONOSCOPY (N/A ) Diagnosis:       Screen for colon cancer      (Polyps hemorrhoids)    Surgeons: Jodi Bear MD Anesthesiologist:     Anesthesia Type: general ASA Status: 1          Anesthesia Type: general    Vitals Value Taken Time   BP 90/59 07/14/22 1554   Temp  07/14/22 1554   Pulse 59 07/14/22 1554   Resp 12 07/14/22 1554   SpO2 100 % 07/14/22 1554       [address] [facility] AN Post Evaluation:   Patient Evaluated in PACU  Patient Participation: complete - patient participated  Level of Consciousness: awake  Pain Score: 0  Pain Management: adequate  Airway Patency:patent  Dental exam unchanged from preop  Yes    Cardiovascular Status: acceptable  Respiratory Status: acceptable  Postoperative Hydration acceptable      Nubia Centeno MD  7/14/2022 3:54 PM

## 2022-07-14 NOTE — INTERVAL H&P NOTE
Pre-op Diagnosis: Screen for colon cancer    The above referenced H&P was reviewed by Erick Tovar MD on 7/14/2022, the patient was examined and no significant changes have occurred in the patient's condition since the H&P was performed. I discussed with the patient and/or legal representative the potential benefits, risks and side effects of this procedure; the likelihood of the patient achieving goals; and potential problems that might occur during recuperation. I discussed reasonable alternatives to the procedure, including risks, benefits and side effects related to the alternatives and risks related to not receiving this procedure. We will proceed with procedure as planned.

## 2022-07-18 ENCOUNTER — TELEPHONE (OUTPATIENT)
Dept: GASTROENTEROLOGY | Facility: CLINIC | Age: 52
End: 2022-07-18

## 2022-07-18 NOTE — TELEPHONE ENCOUNTER
----- Message from Per Marsh MD sent at 7/16/2022  3:05 PM CDT -----  GI staff: please place recall for colonoscopy in 5 years

## 2022-07-18 NOTE — TELEPHONE ENCOUNTER
Entered into Epic. Recall CLN in 5 years per Dr. Yao Liao. Last CLN done 07/14/2022. Recall entered into Patient Outreach for 07/14/2027. Health Maintenance updated.

## 2022-07-19 ENCOUNTER — TELEPHONE (OUTPATIENT)
Dept: GASTROENTEROLOGY | Facility: CLINIC | Age: 52
End: 2022-07-19

## 2022-07-19 DIAGNOSIS — R10.13 DYSPEPSIA: Primary | ICD-10-CM

## 2022-07-19 NOTE — ED NOTES
Pt denies SI.  Pt to be d/c with family Bcc Infiltrative Histology Text: There were numerous aggregates of basaloid cells demonstrating an infiltrative pattern.

## 2022-07-19 NOTE — TELEPHONE ENCOUNTER
Called Pt's listed phone number, Christopher Maldonado. GI Rn's,    If Pt calls back, please relay message below. Ms. Loy Mai,    Thank you for completing your lab work. I hope you are doing better. Your H. Pylori was negative, your TSH was mildly elevated but your Free T4 was WNL, which is reassuring. I do not believe this is contributing to your symptoms. I would recommend following up with your primary care office regarding your TSH. If you are stilling experiencing dyspepsia symptoms, you should restart omeprazole 40 mg daily, 30 mins prior to breakfast for the next 8 weeks. If your symptoms aren't improved in 4 weeks, you can increase to twice per day dosing with second dose 30 mins before dinner. You should taper the medication prior to stopping with every other day dosing on week 9 and every third day dosing on week 10 and then stop medication on week 11. It looks like your insurance does not cover a PPI. You can buy this medication over the counter at your local pharmacy or at [facility].    If her constipation is persistent, you should continue miralax /colace as needed. You can schedule a follow up appointment with the next available provider in 8 weeks if your symptoms are not improving.     Thanks,    Yadi SAHNI

## 2022-09-22 ENCOUNTER — OFFICE VISIT (OUTPATIENT)
Dept: FAMILY MEDICINE CLINIC | Facility: CLINIC | Age: 52
End: 2022-09-22

## 2022-09-22 VITALS
HEART RATE: 67 BPM | DIASTOLIC BLOOD PRESSURE: 77 MMHG | WEIGHT: 125 LBS | HEIGHT: 62 IN | BODY MASS INDEX: 23 KG/M2 | TEMPERATURE: 97 F | SYSTOLIC BLOOD PRESSURE: 120 MMHG

## 2022-09-22 DIAGNOSIS — M95.8 WINGED SCAPULA OF LEFT SIDE: Primary | ICD-10-CM

## 2022-09-22 DIAGNOSIS — S46.812A STRAIN OF LEFT LEVATOR SCAPULAE MUSCLE, INITIAL ENCOUNTER: ICD-10-CM

## 2022-09-22 DIAGNOSIS — S46.812A TRAPEZIUS STRAIN, LEFT, INITIAL ENCOUNTER: ICD-10-CM

## 2022-09-22 DIAGNOSIS — S29.012A RHOMBOID MUSCLE STRAIN, INITIAL ENCOUNTER: ICD-10-CM

## 2022-09-22 PROCEDURE — 3008F BODY MASS INDEX DOCD: CPT | Performed by: FAMILY MEDICINE

## 2022-09-22 PROCEDURE — 3078F DIAST BP <80 MM HG: CPT | Performed by: FAMILY MEDICINE

## 2022-09-22 PROCEDURE — 3074F SYST BP LT 130 MM HG: CPT | Performed by: FAMILY MEDICINE

## 2022-09-22 PROCEDURE — 99214 OFFICE O/P EST MOD 30 MIN: CPT | Performed by: FAMILY MEDICINE

## 2022-09-22 RX ORDER — CELECOXIB 200 MG/1
200 CAPSULE ORAL DAILY
Qty: 90 CAPSULE | Refills: 0 | Status: SHIPPED | OUTPATIENT
Start: 2022-09-22

## 2022-09-22 RX ORDER — CYCLOBENZAPRINE HCL 10 MG
10 TABLET ORAL NIGHTLY
Qty: 30 TABLET | Refills: 0 | Status: SHIPPED | OUTPATIENT
Start: 2022-09-22 | End: 2022-10-22

## 2022-10-03 ENCOUNTER — TELEPHONE (OUTPATIENT)
Dept: FAMILY MEDICINE CLINIC | Facility: CLINIC | Age: 52
End: 2022-10-03

## 2022-10-03 NOTE — TELEPHONE ENCOUNTER
Patient dropped off FMLA forms. [employer] and MARY forms signed. Fee Paid. Patient would like forms completed as soon as possible as she is to return to work this Thursday. Please call when complete.

## 2022-10-05 NOTE — TELEPHONE ENCOUNTER
Dr. Ramesh      Please sign off on form: Disab   -Highlight the patient and hit \"Chart\" button. -In Chart Review, w/in the Encounter tab - click 1 time on the Telephone call encounter for 10/3/22 Scroll down the telephone encounter.  -Click \"scan on\" blue Hyperlink under \"Media\" heading for Disab Dr. Ramesh 10/5/22 w/in the telephone enc.  -Click on Acknowledge button at the bottom right corner and left-click onto image, signature stamp appears and drag signature to Provider signature line. Stamp will turn blue. Close window.      Thank you,  [facility]

## 2022-10-06 ENCOUNTER — TELEPHONE (OUTPATIENT)
Dept: FAMILY MEDICINE CLINIC | Facility: CLINIC | Age: 52
End: 2022-10-06

## 2022-10-06 NOTE — TELEPHONE ENCOUNTER
Patient is requesting an appointment sooner than first available on 10/20. Patient will be scheduling a follow up appointment for her shoulder pain. Patient first discussed her concern with Dr. Juana Rojas on 9/22. Patient declined to see a different provider.  Please advise

## 2022-10-10 ENCOUNTER — TELEPHONE (OUTPATIENT)
Dept: FAMILY MEDICINE CLINIC | Facility: CLINIC | Age: 52
End: 2022-10-10

## 2022-10-10 NOTE — TELEPHONE ENCOUNTER
Patients son calling to ask for note for work modification for patient,he states that she is not even able to lift 5 pounds of weight.  Her employer is asking for this as soon as possible, Please call when ready for , would also like in [address]

## 2022-10-13 ENCOUNTER — HOSPITAL ENCOUNTER (OUTPATIENT)
Dept: GENERAL RADIOLOGY | Age: 52
Discharge: HOME | End: 2022-10-13
Attending: FAMILY MEDICINE
Payer: COMMERCIAL

## 2022-10-13 ENCOUNTER — OFFICE VISIT (OUTPATIENT)
Dept: FAMILY MEDICINE CLINIC | Facility: CLINIC | Age: 52
End: 2022-10-13
Payer: COMMERCIAL

## 2022-10-13 VITALS
WEIGHT: 129 LBS | BODY MASS INDEX: 23.74 KG/M2 | HEART RATE: 84 BPM | HEIGHT: 62 IN | DIASTOLIC BLOOD PRESSURE: 65 MMHG | TEMPERATURE: 98 F | SYSTOLIC BLOOD PRESSURE: 118 MMHG

## 2022-10-13 DIAGNOSIS — M75.52 SUBACROMIAL BURSITIS OF LEFT SHOULDER JOINT: ICD-10-CM

## 2022-10-13 DIAGNOSIS — M25.512 ACUTE PAIN OF LEFT SHOULDER: Primary | ICD-10-CM

## 2022-10-13 DIAGNOSIS — K13.0 LESION OF LIP: ICD-10-CM

## 2022-10-13 DIAGNOSIS — M25.512 ACUTE PAIN OF LEFT SHOULDER: ICD-10-CM

## 2022-10-13 PROCEDURE — 73030 X-RAY EXAM OF SHOULDER: CPT | Performed by: FAMILY MEDICINE

## 2022-10-13 PROCEDURE — 3008F BODY MASS INDEX DOCD: CPT | Performed by: FAMILY MEDICINE

## 2022-10-13 PROCEDURE — 3078F DIAST BP <80 MM HG: CPT | Performed by: FAMILY MEDICINE

## 2022-10-13 PROCEDURE — 3074F SYST BP LT 130 MM HG: CPT | Performed by: FAMILY MEDICINE

## 2022-10-13 PROCEDURE — 99214 OFFICE O/P EST MOD 30 MIN: CPT | Performed by: FAMILY MEDICINE

## 2022-10-14 NOTE — TELEPHONE ENCOUNTER
Forms completed and faxed to Charleen Monreal at # [phone], confirmation rcv'd. [employer] message sent to pt.

## 2022-10-14 NOTE — TELEPHONE ENCOUNTER
Forms completed and faxed to oSnya FLORES at # [phone] confirmation olimpia;seth [employer]" message sent.

## 2023-03-20 ENCOUNTER — HOSPITAL ENCOUNTER (OUTPATIENT)
Age: 53
Discharge: HOME | End: 2023-03-20
Payer: COMMERCIAL

## 2023-03-20 VITALS
DIASTOLIC BLOOD PRESSURE: 77 MMHG | BODY MASS INDEX: 25.76 KG/M2 | TEMPERATURE: 99 F | WEIGHT: 140 LBS | HEART RATE: 86 BPM | RESPIRATION RATE: 22 BRPM | HEIGHT: 62 IN | OXYGEN SATURATION: 100 % | SYSTOLIC BLOOD PRESSURE: 130 MMHG

## 2023-03-20 DIAGNOSIS — Z20.822 ENCOUNTER FOR LABORATORY TESTING FOR COVID-19 VIRUS: ICD-10-CM

## 2023-03-20 DIAGNOSIS — U07.1 COVID-19 VIRUS INFECTION: Primary | ICD-10-CM

## 2023-03-20 LAB
S PYO AG THROAT QL: NEGATIVE
SARS-COV-2 RNA RESP QL NAA+PROBE: DETECTED

## 2023-03-20 PROCEDURE — U0002 COVID-19 LAB TEST NON-CDC: HCPCS | Performed by: NURSE PRACTITIONER

## 2023-03-20 PROCEDURE — 99213 OFFICE O/P EST LOW 20 MIN: CPT | Performed by: NURSE PRACTITIONER

## 2023-03-20 PROCEDURE — 87880 STREP A ASSAY W/OPTIC: CPT | Performed by: NURSE PRACTITIONER

## 2023-03-20 RX ORDER — LIDOCAINE HYDROCHLORIDE 20 MG/ML
5 SOLUTION OROPHARYNGEAL
Qty: 100 ML | Refills: 0 | Status: SHIPPED | OUTPATIENT
Start: 2023-03-20

## 2023-04-26 ENCOUNTER — OFFICE VISIT (OUTPATIENT)
Dept: FAMILY MEDICINE CLINIC | Facility: CLINIC | Age: 53
End: 2023-04-26

## 2023-04-26 ENCOUNTER — LAB ENCOUNTER (OUTPATIENT)
Dept: LAB | Age: 53
End: 2023-04-26
Payer: COMMERCIAL

## 2023-04-26 VITALS
BODY MASS INDEX: 24.66 KG/M2 | RESPIRATION RATE: 16 BRPM | DIASTOLIC BLOOD PRESSURE: 70 MMHG | WEIGHT: 134 LBS | OXYGEN SATURATION: 98 % | HEART RATE: 68 BPM | HEIGHT: 62 IN | TEMPERATURE: 99 F | SYSTOLIC BLOOD PRESSURE: 108 MMHG

## 2023-04-26 DIAGNOSIS — R10.84 GENERALIZED ABDOMINAL PAIN: Primary | ICD-10-CM

## 2023-04-26 DIAGNOSIS — R10.33 PERIUMBILICAL ABDOMINAL PAIN: ICD-10-CM

## 2023-04-26 DIAGNOSIS — K59.00 CONSTIPATION, UNSPECIFIED CONSTIPATION TYPE: ICD-10-CM

## 2023-04-26 DIAGNOSIS — R10.84 GENERALIZED ABDOMINAL PAIN: ICD-10-CM

## 2023-04-26 LAB
ALBUMIN SERPL-MCNC: 3.4 G/DL (ref 3.4–5)
ALBUMIN/GLOB SERPL: 1 {RATIO} (ref 1–2)
ALP LIVER SERPL-CCNC: 66 U/L
ALT SERPL-CCNC: 31 U/L
ANION GAP SERPL CALC-SCNC: 6 MMOL/L (ref 0–18)
AST SERPL-CCNC: 29 U/L (ref 15–37)
BASOPHILS # BLD AUTO: 0.04 X10(3) UL (ref 0–0.2)
BASOPHILS NFR BLD AUTO: 0.8 %
BILIRUB SERPL-MCNC: 0.3 MG/DL (ref 0.1–2)
BUN BLD-MCNC: 15 MG/DL (ref 7–18)
BUN/CREAT SERPL: 26.3 (ref 10–20)
CALCIUM BLD-MCNC: 8.6 MG/DL (ref 8.5–10.1)
CHLORIDE SERPL-SCNC: 109 MMOL/L (ref 98–112)
CO2 SERPL-SCNC: 26 MMOL/L (ref 21–32)
CREAT BLD-MCNC: 0.57 MG/DL
DEPRECATED RDW RBC AUTO: 48.1 FL (ref 35.1–46.3)
EOSINOPHIL # BLD AUTO: 0.32 X10(3) UL (ref 0–0.7)
EOSINOPHIL NFR BLD AUTO: 6.3 %
ERYTHROCYTE [DISTWIDTH] IN BLOOD BY AUTOMATED COUNT: 17.8 % (ref 11–15)
FASTING STATUS PATIENT QL REPORTED: YES
GFR SERPLBLD BASED ON 1.73 SQ M-ARVRAT: 107 ML/MIN/1.73M2 (ref 60–?)
GLOBULIN PLAS-MCNC: 3.5 G/DL (ref 2.8–4.4)
GLUCOSE BLD-MCNC: 86 MG/DL (ref 70–99)
HCT VFR BLD AUTO: 34.3 %
HGB BLD-MCNC: 10.2 G/DL
IMM GRANULOCYTES # BLD AUTO: 0.01 X10(3) UL (ref 0–1)
IMM GRANULOCYTES NFR BLD: 0.2 %
LYMPHOCYTES # BLD AUTO: 1.89 X10(3) UL (ref 1–4)
LYMPHOCYTES NFR BLD AUTO: 37.2 %
MCH RBC QN AUTO: 22.5 PG (ref 26–34)
MCHC RBC AUTO-ENTMCNC: 29.7 G/DL (ref 31–37)
MCV RBC AUTO: 75.6 FL
MONOCYTES # BLD AUTO: 0.54 X10(3) UL (ref 0.1–1)
MONOCYTES NFR BLD AUTO: 10.6 %
NEUTROPHILS # BLD AUTO: 2.28 X10 (3) UL (ref 1.5–7.7)
NEUTROPHILS # BLD AUTO: 2.28 X10(3) UL (ref 1.5–7.7)
NEUTROPHILS NFR BLD AUTO: 44.9 %
OSMOLALITY SERPL CALC.SUM OF ELEC: 292 MOSM/KG (ref 275–295)
PLATELET # BLD AUTO: 247 10(3)UL (ref 150–450)
POTASSIUM SERPL-SCNC: 3.8 MMOL/L (ref 3.5–5.1)
PROT SERPL-MCNC: 6.9 G/DL (ref 6.4–8.2)
RBC # BLD AUTO: 4.54 X10(6)UL
SODIUM SERPL-SCNC: 141 MMOL/L (ref 136–145)
WBC # BLD AUTO: 5.1 X10(3) UL (ref 4–11)

## 2023-04-26 PROCEDURE — 36415 COLL VENOUS BLD VENIPUNCTURE: CPT

## 2023-04-26 PROCEDURE — 85025 COMPLETE CBC W/AUTO DIFF WBC: CPT

## 2023-04-26 PROCEDURE — 3074F SYST BP LT 130 MM HG: CPT

## 2023-04-26 PROCEDURE — 3078F DIAST BP <80 MM HG: CPT

## 2023-04-26 PROCEDURE — 80053 COMPREHEN METABOLIC PANEL: CPT

## 2023-04-26 PROCEDURE — 3008F BODY MASS INDEX DOCD: CPT

## 2023-04-26 PROCEDURE — 99213 OFFICE O/P EST LOW 20 MIN: CPT

## 2023-04-26 RX ORDER — PENICILLIN V POTASSIUM 500 MG/1
TABLET ORAL
COMMUNITY
Start: 2023-03-07 | End: 2023-04-26 | Stop reason: ALTCHOICE

## 2023-05-04 ENCOUNTER — LAB ENCOUNTER (OUTPATIENT)
Dept: LAB | Age: 53
End: 2023-05-04
Payer: COMMERCIAL

## 2023-05-04 DIAGNOSIS — D64.9 ANEMIA, UNSPECIFIED TYPE: ICD-10-CM

## 2023-05-04 LAB
DEPRECATED HBV CORE AB SER IA-ACNC: 3.7 NG/ML
IRON SATN MFR SERPL: 5 %
IRON SERPL-MCNC: 21 UG/DL
TIBC SERPL-MCNC: 456 UG/DL (ref 240–450)
TRANSFERRIN SERPL-MCNC: 306 MG/DL (ref 200–360)

## 2023-05-04 PROCEDURE — 82728 ASSAY OF FERRITIN: CPT

## 2023-05-04 PROCEDURE — 83540 ASSAY OF IRON: CPT

## 2023-05-04 PROCEDURE — 36415 COLL VENOUS BLD VENIPUNCTURE: CPT

## 2023-05-04 PROCEDURE — 84466 ASSAY OF TRANSFERRIN: CPT

## 2023-05-17 ENCOUNTER — OFFICE VISIT (OUTPATIENT)
Dept: OBGYN CLINIC | Facility: CLINIC | Age: 53
End: 2023-05-17

## 2023-05-17 ENCOUNTER — LAB ENCOUNTER (OUTPATIENT)
Dept: LAB | Age: 53
End: 2023-05-17
Attending: NURSE PRACTITIONER
Payer: COMMERCIAL

## 2023-05-17 VITALS — SYSTOLIC BLOOD PRESSURE: 103 MMHG | HEART RATE: 72 BPM | DIASTOLIC BLOOD PRESSURE: 65 MMHG

## 2023-05-17 DIAGNOSIS — R32 URINARY INCONTINENCE, UNSPECIFIED TYPE: ICD-10-CM

## 2023-05-17 DIAGNOSIS — R14.0 BLOATING SYMPTOM: ICD-10-CM

## 2023-05-17 DIAGNOSIS — Z12.4 SCREENING FOR CERVICAL CANCER: ICD-10-CM

## 2023-05-17 DIAGNOSIS — R32 URINARY INCONTINENCE, UNSPECIFIED TYPE: Primary | ICD-10-CM

## 2023-05-17 DIAGNOSIS — Z12.31 ENCOUNTER FOR SCREENING MAMMOGRAM FOR MALIGNANT NEOPLASM OF BREAST: ICD-10-CM

## 2023-05-17 LAB
BILIRUB UR QL: NEGATIVE
CLARITY UR: CLEAR
GLUCOSE UR-MCNC: NORMAL MG/DL
HGB UR QL STRIP.AUTO: NEGATIVE
KETONES UR-MCNC: NEGATIVE MG/DL
LEUKOCYTE ESTERASE UR QL STRIP.AUTO: NEGATIVE
NITRITE UR QL STRIP.AUTO: NEGATIVE
PH UR: 5 [PH] (ref 5–8)
PROT UR-MCNC: NEGATIVE MG/DL
SP GR UR STRIP: 1.02 (ref 1–1.03)
UROBILINOGEN UR STRIP-ACNC: NORMAL

## 2023-05-17 PROCEDURE — 3078F DIAST BP <80 MM HG: CPT | Performed by: NURSE PRACTITIONER

## 2023-05-17 PROCEDURE — 3074F SYST BP LT 130 MM HG: CPT | Performed by: NURSE PRACTITIONER

## 2023-05-17 PROCEDURE — 87086 URINE CULTURE/COLONY COUNT: CPT

## 2023-05-17 PROCEDURE — 87088 URINE BACTERIA CULTURE: CPT

## 2023-05-17 PROCEDURE — 87186 SC STD MICRODIL/AGAR DIL: CPT

## 2023-05-17 PROCEDURE — 99214 OFFICE O/P EST MOD 30 MIN: CPT | Performed by: NURSE PRACTITIONER

## 2023-05-18 ENCOUNTER — TELEPHONE (OUTPATIENT)
Dept: OBGYN CLINIC | Facility: CLINIC | Age: 53
End: 2023-05-18

## 2023-05-18 LAB — HPV I/H RISK 1 DNA SPEC QL NAA+PROBE: NEGATIVE

## 2023-05-18 RX ORDER — NITROFURANTOIN 25; 75 MG/1; MG/1
100 CAPSULE ORAL 2 TIMES DAILY
Qty: 14 CAPSULE | Refills: 0 | Status: SHIPPED | OUTPATIENT
Start: 2023-05-18 | End: 2023-05-25

## 2023-05-18 NOTE — TELEPHONE ENCOUNTER
Please call patient, preliminary urine culture positive for e. Coli.  Please start on macrobid 100 mg PO BID x 7 days

## 2023-05-19 ENCOUNTER — HOSPITAL ENCOUNTER (OUTPATIENT)
Dept: ULTRASOUND IMAGING | Age: 53
Discharge: HOME | End: 2023-05-19
Payer: COMMERCIAL

## 2023-05-19 DIAGNOSIS — R10.33 PERIUMBILICAL ABDOMINAL PAIN: ICD-10-CM

## 2023-05-19 PROCEDURE — 76700 US EXAM ABDOM COMPLETE: CPT

## 2023-05-24 ENCOUNTER — TELEPHONE (OUTPATIENT)
Dept: OBGYN CLINIC | Facility: CLINIC | Age: 53
End: 2023-05-24

## 2023-05-24 ENCOUNTER — TELEPHONE (OUTPATIENT)
Dept: FAMILY MEDICINE CLINIC | Facility: CLINIC | Age: 53
End: 2023-05-24

## 2023-05-24 NOTE — TELEPHONE ENCOUNTER
Spoke with son. He is very concerned about results. He would prefer colpo. I reassured him that colpo not medically necessary, but can certainly consider repeat pap in 1 year as offered below. He asked if US will explain her urinary issues and bloating? Asking if urine test can tell if she needs colpo? Advised US may give us more information on  Urinary issues and bloating, but UA is not related to pap. Her verbalized understanding. Await pelvic US.

## 2023-05-24 NOTE — TELEPHONE ENCOUNTER
Son informed of EMBs recs and verbalized understanding. Son asking for urogyn info to be sent via my chart. message sent.

## 2023-05-24 NOTE — TELEPHONE ENCOUNTER
MARY on file to speak with pts son, Willian Bliss calling for pts pap and hpv results. Pt informed that message will be sent to EMB to review results and we will contact him with further recommendations. Son has many questions regarding testing and asking if we see these results often and if pt is at high risk for cervical cancer since hpv testing was negative. Explained to pts son that we do see ASCUS pap results often and we do colposcopies everyday in the office. Explained to son that EMB will follow the most up to date guidelines regarding next steps and if colpo is needed. Informed son that we will contact him with recs once results are reviewed. Pt verbalized understanding.

## 2023-05-24 NOTE — TELEPHONE ENCOUNTER
Patient's son calling on her behalf to discuss pap results and next course of action. She is with him but there is a language barrier. Please advise.

## 2023-05-24 NOTE — TELEPHONE ENCOUNTER
Pap ASCUS but HPV negative. asccp recommends repeating in 3 years but we can repeat in 1 year if patient desires. Last pap in 2017 normal, negative HPV. Reassuring HPV is negative as HPV biggest reason for abnormal paps.

## 2023-05-24 NOTE — TELEPHONE ENCOUNTER
Agree. Patient should have started medication for UTI last week, UTI can cause worsening urinary symptoms. I did also refer her to urogynecology due to urinary symptoms and mild prolapse.

## 2023-05-24 NOTE — TELEPHONE ENCOUNTER
Patient's son Mame Cerrato called, on MARY, patient with him and also gave verbal permission as she speaks [place]. [address] . Asking about Thin Prep Pap results. Showed abnormal cells. Advised him to contact OBGYN office of [facility] who ordered the test to discuss follow up. Transferred call to that office.

## 2023-06-26 ENCOUNTER — HOSPITAL ENCOUNTER (OUTPATIENT)
Dept: ULTRASOUND IMAGING | Age: 53
Discharge: HOME | End: 2023-06-26
Attending: NURSE PRACTITIONER
Payer: COMMERCIAL

## 2023-06-26 DIAGNOSIS — R14.0 BLOATING SYMPTOM: ICD-10-CM

## 2023-06-26 PROCEDURE — 76830 TRANSVAGINAL US NON-OB: CPT | Performed by: NURSE PRACTITIONER

## 2023-06-26 PROCEDURE — 76856 US EXAM PELVIC COMPLETE: CPT | Performed by: NURSE PRACTITIONER

## 2023-06-27 ENCOUNTER — TELEPHONE (OUTPATIENT)
Dept: OBGYN CLINIC | Facility: CLINIC | Age: 53
End: 2023-06-27

## 2023-06-27 DIAGNOSIS — D25.9 UTERINE LEIOMYOMA, UNSPECIFIED LOCATION: Primary | ICD-10-CM

## 2023-08-23 ENCOUNTER — OFFICE VISIT (OUTPATIENT)
Dept: OBGYN CLINIC | Facility: CLINIC | Age: 53
End: 2023-08-23

## 2023-08-23 VITALS
HEART RATE: 66 BPM | WEIGHT: 137.63 LBS | BODY MASS INDEX: 25 KG/M2 | SYSTOLIC BLOOD PRESSURE: 111 MMHG | DIASTOLIC BLOOD PRESSURE: 70 MMHG

## 2023-08-23 DIAGNOSIS — N39.45 CONTINUOUS URINE LEAKAGE: Primary | ICD-10-CM

## 2023-08-23 PROBLEM — Z12.11 SCREENING FOR COLON CANCER: Status: RESOLVED | Noted: 2019-07-03 | Resolved: 2023-08-23

## 2023-08-23 PROBLEM — Z12.31 SCREENING MAMMOGRAM, ENCOUNTER FOR: Status: RESOLVED | Noted: 2019-07-03 | Resolved: 2023-08-23

## 2023-08-23 PROBLEM — N95.1 PERIMENOPAUSE: Status: RESOLVED | Noted: 2019-07-03 | Resolved: 2023-08-23

## 2023-08-23 PROBLEM — Z00.00 WELL ADULT EXAM: Status: RESOLVED | Noted: 2019-07-03 | Resolved: 2023-08-23

## 2023-08-23 PROCEDURE — 3074F SYST BP LT 130 MM HG: CPT | Performed by: OBSTETRICS & GYNECOLOGY

## 2023-08-23 PROCEDURE — 3078F DIAST BP <80 MM HG: CPT | Performed by: OBSTETRICS & GYNECOLOGY

## 2023-08-23 PROCEDURE — 99213 OFFICE O/P EST LOW 20 MIN: CPT | Performed by: OBSTETRICS & GYNECOLOGY

## 2023-08-25 ENCOUNTER — OFFICE VISIT (OUTPATIENT)
Dept: UROLOGY | Facility: HOSPITAL | Age: 53
End: 2023-08-25
Attending: OBSTETRICS & GYNECOLOGY
Payer: COMMERCIAL

## 2023-08-25 VITALS — WEIGHT: 137 LBS | HEIGHT: 62 IN | BODY MASS INDEX: 25.21 KG/M2 | RESPIRATION RATE: 18 BRPM

## 2023-08-25 DIAGNOSIS — N39.41 URGE INCONTINENCE: Primary | ICD-10-CM

## 2023-08-25 DIAGNOSIS — N39.3 FEMALE STRESS INCONTINENCE: ICD-10-CM

## 2023-08-25 DIAGNOSIS — N81.11 CYSTOCELE, MIDLINE: ICD-10-CM

## 2023-08-25 DIAGNOSIS — N81.84 PELVIC MUSCLE WASTING: ICD-10-CM

## 2023-08-25 DIAGNOSIS — N94.10 DYSPAREUNIA, FEMALE: ICD-10-CM

## 2023-08-25 LAB
BLOOD URINE: NEGATIVE
CONTROL RUN WITHIN 24 HOURS?: YES
LEUKOCYTE ESTERASE URINE: NEGATIVE
NITRITE URINE: NEGATIVE

## 2023-08-25 PROCEDURE — 81002 URINALYSIS NONAUTO W/O SCOPE: CPT | Performed by: OBSTETRICS & GYNECOLOGY

## 2023-08-25 PROCEDURE — 51701 INSERT BLADDER CATHETER: CPT | Performed by: OBSTETRICS & GYNECOLOGY

## 2023-08-25 PROCEDURE — 99212 OFFICE O/P EST SF 10 MIN: CPT

## 2023-08-25 NOTE — PROGRESS NOTES
Called the patients son after the patients visit. Spoke with the patients son, Stef Arrieta and obtained some information. Not able to obtain all information needed as he was     unable to talk at this time. Was able to review UDS pre estimate form. Encouraged to call insurance company and provide the codes to UDS testing to ensure coverage. Will follow.

## 2023-08-25 NOTE — PROGRESS NOTES
Offered  services. Patient refused. Patient states she can call her son Sun Cunningham on the phone and he can translate for her if needed. Verbalized understanding.

## 2023-09-07 ENCOUNTER — OFFICE VISIT (OUTPATIENT)
Dept: UROLOGY | Facility: HOSPITAL | Age: 53
End: 2023-09-07
Attending: OBSTETRICS & GYNECOLOGY
Payer: COMMERCIAL

## 2023-09-07 VITALS — RESPIRATION RATE: 18 BRPM | BODY MASS INDEX: 25.21 KG/M2 | WEIGHT: 137 LBS | HEIGHT: 62 IN

## 2023-09-07 DIAGNOSIS — N39.41 URGE INCONTINENCE: Primary | ICD-10-CM

## 2023-09-07 DIAGNOSIS — N39.3 FEMALE STRESS INCONTINENCE: ICD-10-CM

## 2023-09-07 LAB
BILIRUB UR QL: NEGATIVE
COLOR UR: YELLOW
CONTROL RUN WITHIN 24 HOURS?: YES
GLUCOSE UR-MCNC: NORMAL MG/DL
HGB UR QL STRIP.AUTO: NEGATIVE
KETONES UR-MCNC: NEGATIVE MG/DL
LEUKOCYTE ESTERASE UR QL STRIP.AUTO: 75
NITRITE UR QL STRIP.AUTO: NEGATIVE
NITRITE URINE: NEGATIVE
PH UR: 5 [PH] (ref 5–8)
SP GR UR STRIP: 1.03 (ref 1–1.03)
UROBILINOGEN UR STRIP-ACNC: NORMAL

## 2023-09-07 PROCEDURE — 51797 INTRAABDOMINAL PRESSURE TEST: CPT

## 2023-09-07 PROCEDURE — 87086 URINE CULTURE/COLONY COUNT: CPT

## 2023-09-07 PROCEDURE — 81002 URINALYSIS NONAUTO W/O SCOPE: CPT

## 2023-09-07 PROCEDURE — 51741 ELECTRO-UROFLOWMETRY FIRST: CPT

## 2023-09-07 PROCEDURE — 81001 URINALYSIS AUTO W/SCOPE: CPT

## 2023-09-07 PROCEDURE — 51784 ANAL/URINARY MUSCLE STUDY: CPT

## 2023-09-07 PROCEDURE — 51729 CYSTOMETROGRAM W/VP&UP: CPT

## 2023-09-07 NOTE — PROGRESS NOTES
Incoming telephone call from the patients son Isra Huizar. Isra Huizar called stating that he is concerned as he noticed that his mother had blood in her urine with the testing today. Not to be alarmed. Urine sent for a urinalysis and culture today. The results of the testing will be reviewed with Dr. Buck Coffman at her upcoming visit. Patients son verbalized understanding. Encouraged to call with questions or concerns.

## 2023-09-07 NOTE — PROCEDURES
Patient here for urodynamic testing. Procedure explained and confirmed by patient. See evaluation form for results. Both verbal and written discharge instructions were given. Patient tolerated procedure well and will follow up with Dr. Altaf Clarke on 09/15/2023 for UDS follow up appointment. URODYNAMIC EVALUATION    PATIENT HISTORY:    Prolapse:  No  SHEILA:  Yes  UUI:  Yes (Reports more bothersome)  Nocturia:  0  Frequency:  every 2-3 hours  Sense of Incomplete Emptying:  No  Constipation:  Yes (Bowel regimen reviewed and handouts provided)  Last void prior to UDS testin hour  Current urge to void? None  OAB meds stopped prior to test?  NA Other symptoms? Surgery? []  No  []  Interested in surgery:   Interested in all options    Surgical folder provided? []  Yes  [x]  No     PATIENT DIAGNOSIS:  Cystocele, Midline N81.11, Urge Incontinence N39.41, and Stress Incontinence N39.3    UDS PROCEDURAL FINDINGS:  Cystocele, Midline N81.11, Urge Incontinence N39.41, and Stress Incontinence N39.3    MEDICATION: No outpatient medications have been marked as taking for the 23 encounter (Office Visit) with [facility] THE [facility] PROCEDURE. ALLERGIES:  Patient has no known allergies. EXAM:  Urinalysis Dip: Urinalysis and urine culture obtained per clean catch. Specimens collected on 2023 not received in lab. Today's Results   Component Date Value    control run 2023 Yes     Blood Urine 2023 Trace (A)     Nitrite Urine 2023 Negative     Leukocyte esterase urine 2023 Trace       [place] ( >30 degrees ):  [x]  Mobile  []  Fixed    Perineal Sensation:  [x]  Normal  []  Abnormal    Additional Notes:    PROLAPSE:  []  Yes  [x]  No  Prolapse reduced for testing?   []  Yes  [x]  No  []  Pessary  []  Manual  []  Half Speculum    Additional Notes:    UROFLOWMETRY:  Unreduced    Voided Volume:             13                mL  Maximum Flow Rate:                  2 mL/sec  Average flow rate:               2                         mL/sec  Post-void Residual:             1               mL  Pattern:  []  Normal  [x]  Poor flow     [x]  Intermittent []  Other  Void:   []  Typical  []  Atypical    Additional Notes: Uroflow obtained unreduced. CYSTOMETRY:  Urethral Catheter:  Fr 7 / tdoc  Abd Catheter:     Fr 7 / tdoc   Infusion:  Water Rate 50 mL/min  Temp:  Room  Position:  [x]  Sit  []  Stand  []  Supine  First sensation:   52 mL  First desire to void:   170 mL  Strong desire to void:  310 mL  Maximum cystometric capacity:   351 mL  Detrusor Activity:  [x]  Unstable   []  Stable  Urge leakage? []  Yes [x]  No  Volume at 1st unhibited detrusor cont:   245 mL  Detrusor instability provoked by:    [x]  Spontaneous []  Coughing  [x]  Filling  []  Valsalva  []  Other    Additional Notes:      URETHRAL FUNCTION:  Valsava (vesical) Leak Point Pressures:    Volume Leak Point Pressure Leak? Cough Valsalva      100mL 137 134    cm H2O [x]  Yes []  No   200mL 142 73    cm H2O [x]  Yes []  No   300mL 140 86    cm H2O [x]  Yes []  No       Genuine Stress Incontinence demonstrated? [x]  Yes @ 100 unreduced in the absence of an uninhibited contraction  []  No    Resting Urethral Pressure Profile:     Functional Urethral Length:         1.3 cm          1.1       cm     Maximum UCP:        62   cm          60   cm       PRESSURE/FLOW STUDY:  Unreduced    Voided volume:   392     mL  Maximum flow rate:    20    mL/sec  Pressure Detrusor (at maximum flow):           102 cm H2O  Post void residual:        1       mL  Voiding mechanism:  []  Abnormal  [x]  Normal  []  Strain to void   []  Weak detrusor  Void:   [x]  Typical   []  Atypical    Additional Notes: Flow study obtained unreduced. Uroflowmetry, cystometry, and pressure / flow study were completed using calibrated electronic equipment and air charged transducers.     EMG:  During fill: Reactive    During flow study: Reactive    9/7/2023 9:20 AM     PERFORMED BY:  Niels Rodriguez RN      URODYNAMIC PHYSICIAN INTERPRETATION    IMPRESSION:   13/1cc & 392/1cc  penitentiary 351cc  DO @ 245cc  SHEILA @ 100 unreduced in the absence of an uninhibited contraction     Post-Procedure Diagnoses: Detrusor instability [N31.9] and Stress urinary incontinence [N39.3]    Comments:      Lucky Angelucci, DO   9/7/2023   10:01 AM

## 2023-09-22 ENCOUNTER — OFFICE VISIT (OUTPATIENT)
Dept: UROLOGY | Facility: HOSPITAL | Age: 53
End: 2023-09-22
Attending: OBSTETRICS & GYNECOLOGY
Payer: COMMERCIAL

## 2023-09-22 VITALS — WEIGHT: 137 LBS | BODY MASS INDEX: 25.21 KG/M2 | RESPIRATION RATE: 18 BRPM | HEIGHT: 62 IN

## 2023-09-22 DIAGNOSIS — N39.41 URGE INCONTINENCE: ICD-10-CM

## 2023-09-22 DIAGNOSIS — N39.3 FEMALE STRESS INCONTINENCE: ICD-10-CM

## 2023-09-22 DIAGNOSIS — N32.81 DETRUSOR INSTABILITY: Primary | ICD-10-CM

## 2023-09-22 PROCEDURE — 99212 OFFICE O/P EST SF 10 MIN: CPT

## 2023-09-22 RX ORDER — OXYBUTYNIN CHLORIDE 5 MG/1
5 TABLET, EXTENDED RELEASE ORAL DAILY
Qty: 90 TABLET | Refills: 3 | Status: SHIPPED | OUTPATIENT
Start: 2023-09-22

## 2023-09-22 NOTE — PROGRESS NOTES
Pt presents w/ initial c/o UI  Urodynamic testing undergone without complication. Results reviewed with patient  13/1cc & 392/1cc  longterm 351cc  DO @ 245cc  SHEILA @ 100 unreduced in the absence of an uninhibited contraction     Discussed with patient mgmt options for DO/UUI, SHEILA  Biggest bother is UUI  Here with son  Rare SHEILA  Bothered by daily UUI    Discussed dietary and behavioral modifications for mgmt of urinary symptoms  Discussed weight management and benefits of weight loss on urinary symptoms  Reviewed AUA/SUFU guidelines on mgmt of non-neurogenic OAB  Discussed pharmacologic and nonpharmacologic mgmt options of urinary symptoms - reviewed risks, benefits, alternatives, and goals of treatment  Discussed specific risks related to OAB meds including, but not limited to dry mouth, constipation, blurry vision, cognitive changes, and BP elevation. Thorough discussion of surgical risks, benefits, and alternatives including, but not limited to bleeding/clots, infection, injury to nearby organs (urethra, bladder, ureters, bowel, blood vessels), mesh erosion, dyspareunia, de adithya UUI, worsening SHEILA, recurrence, voiding dysfunction, and pain. All questions answered.   Pt will proceed oxybutynin ER 5mg daily  If SHEILA persists consider pessary vs surgery    Follow up 4 wk med f/u    Dr. Brian Barber

## 2023-10-05 ENCOUNTER — NURSE TRIAGE (OUTPATIENT)
Dept: FAMILY MEDICINE CLINIC | Facility: CLINIC | Age: 53
End: 2023-10-05

## 2023-10-05 NOTE — TELEPHONE ENCOUNTER
Action Requested: Summary for Provider     []  Critical Lab, Recommendations Needed  [] Need Additional Advice  []   FYI    []   Need Orders  [] Need Medications Sent to Pharmacy  []  Other     SUMMARY: Per protocol advised : Office visit     Future Appointments   Date Time Provider Leobardo Isabeli   10/6/2023  9:30 AM Humberto Desir DO [facility]   10/25/2023 12:00 PM GEMMA Merida [facility]         Reason for call: Shoulder Pain  Onset: Data Unavailable    Patient calling ( identified name and  ) states has right shoulder pain  ( had similar pain to left shoulder last year, had PT for that arm )     Patient has not tried any OTC pain meds as of yet   Pain is localized to her shoulder and a bot to her neck    Denies chest  pain, is able to  items and not drop them     ,See care advice given   PAIN MEDICINES: * For pain relief, you can take either acetaminophen, ibuprofen, or naproxen. * They are over-the-counter (OTC) pain drugs. You can buy them at the drugstore. * ACETAMINOPHEN - REGULAR STRENGTH TYLENOL: Take 650 mg (two 325 mg pills) by mouth every 4 to 6 hours as needed. Each Regular Strength Tylenol pill has 325 mg of acetaminophen. The most you should take each day is 3,250 mg (10 pills a day). * ACETAMINOPHEN - EXTRA STRENGTH TYLENOL: Take 1,000 mg (two 500 . REST VS. MOVEMENT: * Movement is generally more healing in the long term than rest. * Continue normal activities as much as your pain permits. * Avoid heavy lifting and active sports for 1 to 2 weeks or until the pain and swelling are gone. *   Patient verbalizes understanding and agrees with plan.          Reason for Disposition   Patient wants to be seen    Protocols used: Shoulder Pain-A-OH

## 2023-10-06 ENCOUNTER — OFFICE VISIT (OUTPATIENT)
Dept: FAMILY MEDICINE CLINIC | Facility: CLINIC | Age: 53
End: 2023-10-06

## 2023-10-06 VITALS
DIASTOLIC BLOOD PRESSURE: 68 MMHG | SYSTOLIC BLOOD PRESSURE: 102 MMHG | WEIGHT: 136 LBS | TEMPERATURE: 98 F | HEIGHT: 62 IN | HEART RATE: 63 BPM | BODY MASS INDEX: 25.03 KG/M2

## 2023-10-06 DIAGNOSIS — M54.2 NECK PAIN ON RIGHT SIDE: Primary | ICD-10-CM

## 2023-10-06 DIAGNOSIS — Z71.85 IMMUNIZATION COUNSELING: ICD-10-CM

## 2023-10-06 DIAGNOSIS — M79.601 RIGHT ARM PAIN: ICD-10-CM

## 2023-10-06 PROCEDURE — 3078F DIAST BP <80 MM HG: CPT | Performed by: FAMILY MEDICINE

## 2023-10-06 PROCEDURE — 99214 OFFICE O/P EST MOD 30 MIN: CPT | Performed by: FAMILY MEDICINE

## 2023-10-06 PROCEDURE — 3008F BODY MASS INDEX DOCD: CPT | Performed by: FAMILY MEDICINE

## 2023-10-06 PROCEDURE — 3074F SYST BP LT 130 MM HG: CPT | Performed by: FAMILY MEDICINE

## 2023-10-06 RX ORDER — CYCLOBENZAPRINE HCL 10 MG
10 TABLET ORAL NIGHTLY
Qty: 30 TABLET | Refills: 0 | Status: SHIPPED | OUTPATIENT
Start: 2023-10-06 | End: 2023-11-05

## 2023-10-06 RX ORDER — CELECOXIB 200 MG/1
200 CAPSULE ORAL DAILY
Qty: 90 CAPSULE | Refills: 0 | Status: SHIPPED | OUTPATIENT
Start: 2023-10-06

## 2023-10-06 NOTE — PATIENT INSTRUCTIONS
Think about getting the flu shot which will help prevent influenza and then also think about the new shingles vaccine which is 2 shots, 2 months apart. Ice your neck for 10 minutes 2 or 3 times a day.

## 2023-10-10 ENCOUNTER — TELEPHONE (OUTPATIENT)
Dept: FAMILY MEDICINE CLINIC | Facility: CLINIC | Age: 53
End: 2023-10-10

## 2023-10-17 ENCOUNTER — TELEPHONE (OUTPATIENT)
Dept: FAMILY MEDICINE CLINIC | Facility: CLINIC | Age: 53
End: 2023-10-17

## 2023-10-17 NOTE — TELEPHONE ENCOUNTER
Pt was seen on 10/6/23 and was told to come back on 10/20. Pt said on 10/6 someone called her to let her know she needed to come back on 10/20. Pt called today to confirm that appt but I do not see it. Next appt is 10/31 but is requesting the appt she was told on 10/20. Pt does not mind if its a virtual. Please advise

## 2023-10-19 ENCOUNTER — TELEPHONE (OUTPATIENT)
Dept: FAMILY MEDICINE CLINIC | Facility: CLINIC | Age: 53
End: 2023-10-19

## 2023-10-19 NOTE — TELEPHONE ENCOUNTER
Lindsay Lloyd  [phone]     He wants Dr Michelle Be to put a note in pt's mychart today for employer extending her leave until Thursday 10-25-23 when she has an appt. Stating her arm hurts. She canceled her appt for today w/ Dr Michelle Be because her stomach hurts.

## 2023-10-25 ENCOUNTER — TELEPHONE (OUTPATIENT)
Dept: UROLOGY | Facility: HOSPITAL | Age: 53
End: 2023-10-25

## 2023-10-25 ENCOUNTER — VIRTUAL PHONE E/M (OUTPATIENT)
Dept: UROLOGY | Facility: HOSPITAL | Age: 53
End: 2023-10-25
Attending: PHYSICIAN ASSISTANT

## 2023-10-25 DIAGNOSIS — N32.81 DETRUSOR INSTABILITY: Primary | ICD-10-CM

## 2023-10-25 DIAGNOSIS — N39.41 URGE URINARY INCONTINENCE: ICD-10-CM

## 2023-10-25 NOTE — PROGRESS NOTES
Patient presents to follow up UUI/DO    Began oxybutynin ER 5 mg last month  Tolerating ***    Bowels ***    UDS 9/7/23:  13/1cc & 392/1cc  care home 351cc  DO @ 245cc  SHEILA @ 100 unreduced in the absence of an uninhibited contraction     Urogynecology Summary:       Vitals: There were no vitals taken for this visit. GENERAL EXAM:  GENERAL: alert & oriented, NAD  HEENT: NC/AT, sclera without injection  SKIN: no rashes  LUNGS:  without increased respiratory effort  ABDOMEN: soft, non-tender, non-distended, no masses appreciated  EXT: no edema    PELVIC EXAM:  Ext. Gen: ***atrophy, no lesions  Urethra: ***atrophy, ***tender  Bladder:***fullness, ***tender  Vagina: ***atrophy  Cervix: ***bleeding, ***lesions, ***tender  Uterus: ***mobile  Adnexa:***masses, ***tender  Perineum: ***tender  Anus: ***hemorrhoids  Rectum: ***tender, nl sphincter tone    PELVIS FLOOR NEUROMUSCULAR FUNCTION:  Strength:  {UG Strength:4736}  Perineal Sensation:  {NORMAL - ABNORMAL:1494}    PELVIC SUPPORT:  [place]:  {UG 0-4:4737}  Ant:  {UG 0-4:4737}  Post:  {UG 0-4:4737}  CST:  {POSITIVE/NEGATIVE FOR:12624}  UVJ: ***hypermobile    Impression/Plan:  No diagnosis found. Discussion Items:   { Discussion Items:4177}    Diagnostic Items:  {[facility] ZSQNV:0232}    Medications Discussed:  ***    Treatment Plan:  ***  ***Continue vag estrogen cream as rx  ***Bowel regimen  ***Bladder diet/drill  ***Pelvic floor exercises  Call with s/sx of UTI    All questions answered  She understands and agrees to plan    No follow-ups on file.     Cherylene Raven, PA-C

## 2023-10-25 NOTE — PROGRESS NOTES
Started phone visit with patient and her son  Pt was at work, requested phone call after 3:30 pm today  Attempted to call pt and son x3, no answer  Will r/s phone visit

## 2023-10-25 NOTE — TELEPHONE ENCOUNTER
Pt had scheduled video visit today for f/u of UUI/DO  Her son, Fanny Lauren, is also on the phone and is interpreting at the patient's request    She is currently taking oxybutynin 5 mg  Denies SEs  Reports some improvement in leaking but no change in frequency or urgency    Denies nocturia     C/o dark urine and lower abd pain the past few days  Denies dysuria, hematuria    Pt is currently at work, requesting phone call after 3:30 pm to further discuss    Attempted to call pt and son x3 with no answer    Please call to r/s phone visit

## 2023-10-26 ENCOUNTER — OFFICE VISIT (OUTPATIENT)
Dept: FAMILY MEDICINE CLINIC | Facility: CLINIC | Age: 53
End: 2023-10-26

## 2023-10-26 ENCOUNTER — VIRTUAL PHONE E/M (OUTPATIENT)
Dept: UROLOGY | Facility: HOSPITAL | Age: 53
End: 2023-10-26
Attending: PHYSICIAN ASSISTANT

## 2023-10-26 ENCOUNTER — LAB ENCOUNTER (OUTPATIENT)
Dept: LAB | Age: 53
End: 2023-10-26
Attending: PHYSICIAN ASSISTANT

## 2023-10-26 VITALS
WEIGHT: 134 LBS | DIASTOLIC BLOOD PRESSURE: 63 MMHG | SYSTOLIC BLOOD PRESSURE: 99 MMHG | HEART RATE: 65 BPM | BODY MASS INDEX: 24.66 KG/M2 | HEIGHT: 62 IN | TEMPERATURE: 97 F

## 2023-10-26 DIAGNOSIS — M62.838 TRAPEZIUS MUSCLE SPASM: ICD-10-CM

## 2023-10-26 DIAGNOSIS — M79.601 RIGHT ARM PAIN: ICD-10-CM

## 2023-10-26 DIAGNOSIS — N32.81 DETRUSOR INSTABILITY: Primary | ICD-10-CM

## 2023-10-26 DIAGNOSIS — M54.2 NECK PAIN ON RIGHT SIDE: ICD-10-CM

## 2023-10-26 DIAGNOSIS — R82.998 DARK URINE: ICD-10-CM

## 2023-10-26 DIAGNOSIS — N32.81 OAB (OVERACTIVE BLADDER): ICD-10-CM

## 2023-10-26 DIAGNOSIS — S46.811D TRAPEZIUS STRAIN, RIGHT, SUBSEQUENT ENCOUNTER: ICD-10-CM

## 2023-10-26 DIAGNOSIS — K59.00 CONSTIPATION, UNSPECIFIED CONSTIPATION TYPE: ICD-10-CM

## 2023-10-26 DIAGNOSIS — N39.41 URGE URINARY INCONTINENCE: ICD-10-CM

## 2023-10-26 DIAGNOSIS — R82.998 DARK URINE: Primary | ICD-10-CM

## 2023-10-26 LAB
BILIRUB UR QL: NEGATIVE
CLARITY UR: CLEAR
GLUCOSE UR-MCNC: NORMAL MG/DL
HGB UR QL STRIP.AUTO: NEGATIVE
KETONES UR-MCNC: NEGATIVE MG/DL
LEUKOCYTE ESTERASE UR QL STRIP.AUTO: 25
NITRITE UR QL STRIP.AUTO: NEGATIVE
PH UR: 6 [PH] (ref 5–8)
PROT UR-MCNC: NEGATIVE MG/DL
SP GR UR STRIP: 1.03 (ref 1–1.03)
UROBILINOGEN UR STRIP-ACNC: NORMAL

## 2023-10-26 PROCEDURE — 81001 URINALYSIS AUTO W/SCOPE: CPT

## 2023-10-26 PROCEDURE — 3074F SYST BP LT 130 MM HG: CPT | Performed by: FAMILY MEDICINE

## 2023-10-26 PROCEDURE — 3078F DIAST BP <80 MM HG: CPT | Performed by: FAMILY MEDICINE

## 2023-10-26 PROCEDURE — 99214 OFFICE O/P EST MOD 30 MIN: CPT | Performed by: FAMILY MEDICINE

## 2023-10-26 PROCEDURE — 87086 URINE CULTURE/COLONY COUNT: CPT

## 2023-10-26 PROCEDURE — 87186 SC STD MICRODIL/AGAR DIL: CPT

## 2023-10-26 PROCEDURE — 3008F BODY MASS INDEX DOCD: CPT | Performed by: FAMILY MEDICINE

## 2023-10-26 PROCEDURE — 87088 URINE BACTERIA CULTURE: CPT

## 2023-10-26 RX ORDER — OXYBUTYNIN CHLORIDE 10 MG/1
10 TABLET, EXTENDED RELEASE ORAL DAILY
Qty: 90 TABLET | Refills: 3 | Status: SHIPPED | OUTPATIENT
Start: 2023-10-26

## 2023-10-26 RX ORDER — LIDOCAINE 50 MG/G
1 PATCH TOPICAL EVERY 24 HOURS
Qty: 30 PATCH | Refills: 0 | Status: SHIPPED | OUTPATIENT
Start: 2023-10-26

## 2023-10-26 NOTE — PROGRESS NOTES
Given circumstances surrounding COVID-19 this visit is being conducted as a televisit with pt's consent. Pt in safe, private environment for televisit, provider located in the office setting. Offered  (Ashley), pt declined    Visit for f/u of UUI/DO    She is currently taking oxbutynin 5 mg  Denies SEs  Reports some improvement in leaking  Denies urgency, just \"leaks all the time\"  SHEILA not currently bothersome  Denies nocturia  Voiding q 2-3 hrs during day    Bowels constipated - using Miralax     C/o dark urine the past few days  Saw her PCP today who collected sample  Denies dysuria    Impression/Plan:  (N32.81) Detrusor instability  (primary encounter diagnosis)    (N39.41) Urge urinary incontinence    (K59.00) Constipation, unspecified constipation type      Discussion Items:   Discussed dietary and behavioral modifications for mgmt of urinary symptoms. Discussed weight management and benefits of weight loss on urinary symptoms. Reviewed AUA/SUFU guidelines on mgmt of non-neurogenic OAB. Discussed pharmacologic and nonpharmacologic mgmt options of urinary symptoms - reviewed risks, benefits, alternatives, and goals of treatment. Discussed specific risks related to OAB meds including, but not limited to dry mouth, constipation, blurry vision, cognitive changes, and BP elevation. Bowel management reviewed. Discussed using fiber daily w/ addition of miralax as needed. Diagnostic Items:  None (urine collected today at PCP office)    Medications Discussed:  Oxybutynin  Mentioned myrbetriq, gemtesa  Fiber  Miralax    Treatment Plan:  Increase oxybutynin to 10 mg daily -- call if constipation persists and will switch meds  Cont Miralax, add fiber  Call with s/sx of UTI    All questions answered. She understands and agrees to plan. Return in about 4 weeks (around 11/23/2023) for UUI (phone visit).     Kevin Hauser PA-C

## 2023-10-26 NOTE — PATIENT INSTRUCTIONS
Do urinalysis today. Do the x-ray of your neck today. Make an appointment with Dr. Abhijit Messina or one of his partners for pain management. Do the lidocaine patch on the area of pain daily.

## 2023-10-27 NOTE — TELEPHONE ENCOUNTER
Type of Leave:  Disab  Reason for Leave:  Shoulder issue (R)  Start date of leave:  10/7/23  How much time needed?:  Letter states that pt is to RTW on 11/9/23  Forms Due Date:  ASAP  Was Fee and Turnaround info Given?:  Yes, 10-15 business day turn around time

## 2023-10-27 NOTE — TELEPHONE ENCOUNTER
Per patient she states that, [employer] disability faxed again the form to [facility] [phone] and patient would like to know if received. Please advise.

## 2023-10-30 NOTE — TELEPHONE ENCOUNTER
Dr. Hunter June,      *The ACKNOWLEDGE button has been moved to the top right ribbon*    Please sign off on form if you agree to: Disab due to pt's R shoulder pain  start: 10/10/23---RTW 11/9/23  (place your signature on the first page only)    -From your Inbasket, Highlight the patient and click Chart   -Double click the 33/60/54 Forms Completion telephone encounter  -María Hardin down to the Media section   -Click the blue Hyperlink: Disab Dr. Hunter June 49/54/50   -Click Acknowledge located in the top right ribbon/menu   -Drag the mouse into the blank space of the document and a + sign will appear. Left click to   electronically sign the document.      Thank you,  [facility]

## 2023-11-01 NOTE — TELEPHONE ENCOUNTER
Disab forms completed and faxed to [facility] at [phone] confirmation rcv'd. [employer] message sent to pt.

## 2023-11-05 RX ORDER — CIPROFLOXACIN 500 MG/1
500 TABLET, FILM COATED ORAL 2 TIMES DAILY
Qty: 10 TABLET | Refills: 0 | Status: SHIPPED | OUTPATIENT
Start: 2023-11-05 | End: 2023-11-10

## 2023-11-07 NOTE — TELEPHONE ENCOUNTER
Pt son Ke Berrioshalima (on file) called forms dept, son Maria L Whyte is stating forms are completed incorrectly. Informed pt son if they had informed them what was completed incorrect, son not able to inform me. Advised pt son to call Irena Azevedo and verify what is in question. Informed pt son we will upload forms to pts [employer] so when he called mayank he had forms to go over with disab co. Pt son verbalized understanding. Forms uploaded to pts [employer].

## 2023-11-09 ENCOUNTER — TELEPHONE (OUTPATIENT)
Dept: FAMILY MEDICINE CLINIC | Facility: CLINIC | Age: 53
End: 2023-11-09

## 2023-11-09 NOTE — TELEPHONE ENCOUNTER
Patient called and said her work sent a fax today, that is requiring more information for her day off on 10/10. Is requesting to speak to a nurse over this.

## 2023-11-09 NOTE — TELEPHONE ENCOUNTER
I spoke with patient verified name and      Patient was informed FMLA forms were completed per TE 10/30/23 and 23. Patient repeats saying \" My job is not going to pay me for the time off\" I am confused on what days patient is talking about as FMLA is completed I provided patient with phone number to the forms department for further assistance.

## 2023-11-10 NOTE — TELEPHONE ENCOUNTER
Pt following up on call. Pt states she called [employer] again and states they re faxed the form to the office today.  Clinical staff, please assist and watch for fax.  Pt provided fax number [phone] to fax back.    Forms department, are you able to assist Pt?

## 2023-11-10 NOTE — TELEPHONE ENCOUNTER
Returned pt's call. Pt states that her employer is denying her Disab due to \"missing information\". I informed the pt that she will need to follow up with [facility] regarding any denials. If a revision or PHI is needed, we will need a request from [facility]. She verbally understood. Pt also stated that she was told by Dr. Hardy Hines office that new forms had been sent to our dept, by them, on 11/7/23. I informed the pt that we have not received any new [facility] forms at this time, nor is there any note from Dr. Hardy Hines office stating forms have been sent to us. Pt will reach out to [address] [place] [address] providers office.

## 2023-11-10 NOTE — TELEPHONE ENCOUNTER
Patient calling (identified full name and ) a second time to follow up on form completion. Patient wanted to make sure that the office received the forms that were re-faxed yesterday, .  Patient is very concerned about this matter.    Please contact patient to update and further assist.  Thank you    Please refer to many messages under the 'NOTES' tab

## 2023-11-13 ENCOUNTER — TELEPHONE (OUTPATIENT)
Dept: FAMILY MEDICINE CLINIC | Facility: CLINIC | Age: 53
End: 2023-11-13

## 2023-11-13 NOTE — TELEPHONE ENCOUNTER
Son,would like to know if Dr. Eartha Shone can give the patient a note so that the patient can be off of  work for the next 2 weeks. Son, states that the patient is still on antibiotics. Son, would like the note added to the patient's my chart. Please, call the son with any questions.

## 2023-11-13 NOTE — TELEPHONE ENCOUNTER
Patient son called and says he spoke to [place] in regards to moms leave and they told her that the information provided for STD was not enough for her to get approved and patient needs a doctors note from provider to explain why she needs to be on STD. Patient was advised will need to contact provider office for work note- verbal understanding will contact provider office to schedule appt.

## 2023-11-13 NOTE — TELEPHONE ENCOUNTER
Because this is a urinary issue, she will need to get the note from the urology department.   Also I do not see that she made an appointment with the pain medicine specialist.  We already know that home exercises are not working for her and therefore I told her she needed to do formal physical therapy or see the pain specialist.

## 2023-11-14 NOTE — TELEPHONE ENCOUNTER
I went ahead and did the note for but she really needs to see the pain specialist as she continues to have arm pain and she has not done any physical therapy and that is why I had sent her to the pain doctor.

## 2023-11-30 ENCOUNTER — VIRTUAL PHONE E/M (OUTPATIENT)
Dept: UROLOGY | Facility: HOSPITAL | Age: 53
End: 2023-11-30
Attending: PHYSICIAN ASSISTANT
Payer: COMMERCIAL

## 2023-11-30 DIAGNOSIS — Z91.199 NO-SHOW FOR APPOINTMENT: Primary | ICD-10-CM

## 2023-11-30 NOTE — PROGRESS NOTES
Attempted to call patient for her phone visit x2 with no answer. Voicemail left to call office, will need to r/s.

## 2023-12-04 NOTE — TELEPHONE ENCOUNTER
Patient wants Dr Osborne's office to call her today regarding Diana paperwork.    Call her today at:  [phone] , ok to lv a voice message    She is unhappy she has been waiting for Dr Osborne's office to complete this.    She is going to tell you each page of form needs to be signed by Dr Osborne, the paper Toni faxed to Dr Osborne today.

## 2023-12-07 ENCOUNTER — TELEPHONE (OUTPATIENT)
Dept: FAMILY MEDICINE CLINIC | Facility: CLINIC | Age: 53
End: 2023-12-07

## 2023-12-07 NOTE — TELEPHONE ENCOUNTER
Patient insisting that I send a message to Dr Osborne to let him know that her employer sent him a fax this past Monday for Dr De Luna to just sign and fax back. I told the patient to contact [facility], but she she refused and wants me to send message.

## 2023-12-08 NOTE — TELEPHONE ENCOUNTER
FMLA and MARY (incomplete) received at the Forms Department. Sent [facility] message for incomplete auth. Logged for processing.

## 2024-01-12 ENCOUNTER — TELEPHONE (OUTPATIENT)
Dept: FAMILY MEDICINE CLINIC | Facility: CLINIC | Age: 54
End: 2024-01-12

## 2024-01-12 NOTE — TELEPHONE ENCOUNTER
Call made no answer. Lmtcb to gather more information as to what they are requesting patient has not been seen recently

## 2024-01-12 NOTE — TELEPHONE ENCOUNTER
Jesse from  office looking for peer to peer notes for restrictions or limitations. Jesse would like a call back. Please advise

## 2024-02-01 ENCOUNTER — LAB ENCOUNTER (OUTPATIENT)
Dept: LAB | Age: 54
End: 2024-02-01
Attending: FAMILY MEDICINE
Payer: COMMERCIAL

## 2024-02-01 DIAGNOSIS — D50.9 IRON DEFICIENCY ANEMIA, UNSPECIFIED IRON DEFICIENCY ANEMIA TYPE: ICD-10-CM

## 2024-02-01 LAB
BASOPHILS # BLD AUTO: 0.03 X10(3) UL (ref 0–0.2)
BASOPHILS NFR BLD AUTO: 0.7 %
DEPRECATED HBV CORE AB SER IA-ACNC: 40.5 NG/ML
DEPRECATED RDW RBC AUTO: 38.6 FL (ref 35.1–46.3)
EOSINOPHIL # BLD AUTO: 0.31 X10(3) UL (ref 0–0.7)
EOSINOPHIL NFR BLD AUTO: 7.4 %
ERYTHROCYTE [DISTWIDTH] IN BLOOD BY AUTOMATED COUNT: 11.9 % (ref 11–15)
HCT VFR BLD AUTO: 41.9 %
HGB BLD-MCNC: 13.9 G/DL
IMM GRANULOCYTES # BLD AUTO: 0 X10(3) UL (ref 0–1)
IMM GRANULOCYTES NFR BLD: 0 %
LYMPHOCYTES # BLD AUTO: 1.49 X10(3) UL (ref 1–4)
LYMPHOCYTES NFR BLD AUTO: 35.4 %
MCH RBC QN AUTO: 28.8 PG (ref 26–34)
MCHC RBC AUTO-ENTMCNC: 33.2 G/DL (ref 31–37)
MCV RBC AUTO: 86.9 FL
MONOCYTES # BLD AUTO: 0.36 X10(3) UL (ref 0.1–1)
MONOCYTES NFR BLD AUTO: 8.6 %
NEUTROPHILS # BLD AUTO: 2.02 X10 (3) UL (ref 1.5–7.7)
NEUTROPHILS # BLD AUTO: 2.02 X10(3) UL (ref 1.5–7.7)
NEUTROPHILS NFR BLD AUTO: 47.9 %
PLATELET # BLD AUTO: 190 10(3)UL (ref 150–450)
RBC # BLD AUTO: 4.82 X10(6)UL
WBC # BLD AUTO: 4.2 X10(3) UL (ref 4–11)

## 2024-02-01 PROCEDURE — 82728 ASSAY OF FERRITIN: CPT

## 2024-02-01 PROCEDURE — 36415 COLL VENOUS BLD VENIPUNCTURE: CPT

## 2024-02-01 PROCEDURE — 85025 COMPLETE CBC W/AUTO DIFF WBC: CPT

## 2024-05-30 ENCOUNTER — NURSE TRIAGE (OUTPATIENT)
Dept: FAMILY MEDICINE CLINIC | Facility: CLINIC | Age: 54
End: 2024-05-30

## 2024-05-30 NOTE — TELEPHONE ENCOUNTER
Action Requested: Summary for Provider     []  Critical Lab, Recommendations Needed  [] Need Additional Advice  []   FYI    []   Need Orders  [] Need Medications Sent to Pharmacy  []  Other     SUMMARY: Patient calling today, complaints of on and off loose stools and more often overnight. She has been having to get up sometimes 1-3 times with loose BM's. No pain or cramping. No recent travel or ABT's. No fever, nausea or vomiting. Patient notices when this happens that sometimes her urine color is \"dark and not clear\". Patient has tried to limit milk products and have cut back on fruits to see if any difference. Limiting caffine products. Not taking anything OTC other than once she took immodium to help with symptoms.     Patient asking for urine testing and stools tests. Wanted these prior to being seen but advised she would need to be seen first to determine what testing needs to be ordered.     ER warning signs provided and patient states understanding.     Reason for call: Diarrhea  Onset: on and off for one month       Reason for Disposition   MILD diarrhea (e.g., 1-3 or more stools than normal in past 24 hours) diarrhea without known cause and present > 7 days    Protocols used: Diarrhea-A-[place]

## 2024-05-31 ENCOUNTER — LAB ENCOUNTER (OUTPATIENT)
Dept: LAB | Age: 54
End: 2024-05-31
Attending: FAMILY MEDICINE
Payer: COMMERCIAL

## 2024-05-31 ENCOUNTER — OFFICE VISIT (OUTPATIENT)
Dept: FAMILY MEDICINE CLINIC | Facility: CLINIC | Age: 54
End: 2024-05-31

## 2024-05-31 VITALS
HEART RATE: 79 BPM | DIASTOLIC BLOOD PRESSURE: 77 MMHG | SYSTOLIC BLOOD PRESSURE: 112 MMHG | WEIGHT: 131 LBS | BODY MASS INDEX: 24 KG/M2

## 2024-05-31 DIAGNOSIS — N30.00 ACUTE CYSTITIS WITHOUT HEMATURIA: ICD-10-CM

## 2024-05-31 DIAGNOSIS — R19.7 DIARRHEA, UNSPECIFIED TYPE: ICD-10-CM

## 2024-05-31 DIAGNOSIS — N30.00 ACUTE CYSTITIS WITHOUT HEMATURIA: Primary | ICD-10-CM

## 2024-05-31 PROCEDURE — 99214 OFFICE O/P EST MOD 30 MIN: CPT | Performed by: FAMILY MEDICINE

## 2024-05-31 PROCEDURE — 87427 SHIGA-LIKE TOXIN AG IA: CPT

## 2024-05-31 PROCEDURE — 3074F SYST BP LT 130 MM HG: CPT | Performed by: FAMILY MEDICINE

## 2024-05-31 PROCEDURE — 87046 STOOL CULTR AEROBIC BACT EA: CPT

## 2024-05-31 PROCEDURE — 81003 URINALYSIS AUTO W/O SCOPE: CPT

## 2024-05-31 PROCEDURE — 3078F DIAST BP <80 MM HG: CPT | Performed by: FAMILY MEDICINE

## 2024-05-31 PROCEDURE — 87045 FECES CULTURE AEROBIC BACT: CPT

## 2024-05-31 NOTE — PROGRESS NOTES
Chief Complaint   Patient presents with    Diarrhea     X 1 month       HPI:   Vivian Coyne is a 54 year old female who presents to clinic with complaints of diarrhea for two weeks. No new foods, travel, f/c/n/v.   Cln is utd.   No contact with hospitalized persons, prisoners or sick contacts.   No recent abx.   REVIEW OF SYSTEMS:   Negative, except per HPI.     EXAM:   /77   Pulse 79   Wt 131 lb (59.4 kg)   BMI 23.96 kg/m²   Body mass index is 23.96 kg/m².  GENERAL: well developed, well nourished, in no apparent distress  SKIN: no rashes, no suspicious lesions  HEENT: atraumatic, normocephalic  EYES: PERRLA, EOMI,conjunctiva are clear  NECK: supple, no adenopathy    ASSESSMENT AND PLAN:     1. Diarrhea, unspecified type  - acute issue. Take immodium otc for now.   - Stool Culture [E]; Future    2. Acute cystitis without hematuria  - Urinalysis with Culture Reflex; Future     RTC if no improvement in symptoms. Red flags discussed to go to ER.     Shell Jeffrey MD  5/31/2024  4:09 PM

## 2024-06-10 ENCOUNTER — TELEPHONE (OUTPATIENT)
Dept: FAMILY MEDICINE CLINIC | Facility: CLINIC | Age: 54
End: 2024-06-10

## 2024-06-10 NOTE — TELEPHONE ENCOUNTER
Patient calling, was seen May 31st in clinic - discussed Diarrhea symptoms she has been having. She had called off work 6/3/24 for this problem and is requesting a note from Dr. Jeffrey to excuse her from work for only 6/3/24. Her work is now calling asking why she was off and said she needs note - she works at [employer]. Please advise.     Patient did express no new symptoms and are a little better in sense that she does not have the diarrhea daily. She will go 2-3 days with normal stools then have 2-3 days of loose frequent stools. Last night she says that she was in the bathroom 3 times during the night.     Are there any other recommendations for plan for evaluation of this?

## 2024-06-10 NOTE — TELEPHONE ENCOUNTER
Can write a note saying she saw MD Jeffrey in office that day if it helps. As for diarrhea work up by MD jeffrey was negative. She recommended OTC immodium. If symptoms persist would recommend follow up for evaluation.

## 2024-06-10 NOTE — TELEPHONE ENCOUNTER
Left message for patient to call back and discuss on home #[phone].   Reached son Itzel on #[phone], he is on verbal release. Itzel notified, verbalized understanding. [facility].

## 2024-07-29 ENCOUNTER — NURSE TRIAGE (OUTPATIENT)
Dept: FAMILY MEDICINE CLINIC | Facility: CLINIC | Age: 54
End: 2024-07-29

## 2024-07-29 NOTE — TELEPHONE ENCOUNTER
Itzel, son called for patient. He is seeking xray orders.   Patient complains of back pain.   He states patient slipped Friday night at work.     Patient is not in severe pain at this time.   Patient seeking appointment.     Appointment given 7/30/24  to further evaluate.    Reason for Disposition   Patient wants to be seen    Protocols used: Back Pain-A-[place]

## 2024-07-30 ENCOUNTER — OFFICE VISIT (OUTPATIENT)
Dept: FAMILY MEDICINE CLINIC | Facility: CLINIC | Age: 54
End: 2024-07-30

## 2024-07-30 ENCOUNTER — HOSPITAL ENCOUNTER (OUTPATIENT)
Dept: GENERAL RADIOLOGY | Age: 54
Discharge: HOME | End: 2024-07-30
Attending: FAMILY MEDICINE
Payer: COMMERCIAL

## 2024-07-30 VITALS
WEIGHT: 131.5 LBS | DIASTOLIC BLOOD PRESSURE: 60 MMHG | BODY MASS INDEX: 24 KG/M2 | SYSTOLIC BLOOD PRESSURE: 96 MMHG | TEMPERATURE: 97 F | HEART RATE: 79 BPM

## 2024-07-30 DIAGNOSIS — M54.2 NECK PAIN ON RIGHT SIDE: ICD-10-CM

## 2024-07-30 DIAGNOSIS — M54.50 ACUTE MIDLINE LOW BACK PAIN WITHOUT SCIATICA: ICD-10-CM

## 2024-07-30 DIAGNOSIS — S20.229A CONTUSION OF BACK, UNSPECIFIED LATERALITY, INITIAL ENCOUNTER: Primary | ICD-10-CM

## 2024-07-30 DIAGNOSIS — M62.838 TRAPEZIUS MUSCLE SPASM: ICD-10-CM

## 2024-07-30 DIAGNOSIS — S20.229A CONTUSION OF BACK, UNSPECIFIED LATERALITY, INITIAL ENCOUNTER: ICD-10-CM

## 2024-07-30 DIAGNOSIS — M79.601 RIGHT ARM PAIN: ICD-10-CM

## 2024-07-30 PROCEDURE — 72050 X-RAY EXAM NECK SPINE 4/5VWS: CPT | Performed by: FAMILY MEDICINE

## 2024-07-30 PROCEDURE — 72110 X-RAY EXAM L-2 SPINE 4/>VWS: CPT | Performed by: FAMILY MEDICINE

## 2024-07-30 PROCEDURE — 99214 OFFICE O/P EST MOD 30 MIN: CPT | Performed by: FAMILY MEDICINE

## 2024-07-30 PROCEDURE — 3078F DIAST BP <80 MM HG: CPT | Performed by: FAMILY MEDICINE

## 2024-07-30 PROCEDURE — 3074F SYST BP LT 130 MM HG: CPT | Performed by: FAMILY MEDICINE

## 2024-07-30 PROCEDURE — G2211 COMPLEX E/M VISIT ADD ON: HCPCS | Performed by: FAMILY MEDICINE

## 2024-07-30 NOTE — PROGRESS NOTES
Patient ID: Vivian Coyne is a 54 year old female.    HPI  Chief Complaint   Patient presents with    Back Pain     X 3 days     She states on the  she was at work at [employer] and she works at a bakery.  She went to open a door and the door handle fell off causing her to fall backwards onto her buttock.  She did not hit her head.  She states that sitting hurts terribly and then it is also stiff in the morning when she wakes up.  If she is busy during the day it is not terrible.  There is no pain down the legs.  There is no loss of bowel or bladder control.  They did do a report at her work.          Wt Readings from Last 6 Encounters:   24 131 lb 8 oz (59.6 kg)   24 131 lb (59.4 kg)   10/26/23 134 lb (60.8 kg)   10/06/23 136 lb (61.7 kg)   23 137 lb (62.1 kg)   23 137 lb (62.1 kg)       BMI Readings from Last 6 Encounters:   24 24.05 kg/m²   24 23.96 kg/m²   10/26/23 24.51 kg/m²   10/06/23 24.87 kg/m²   23 25.06 kg/m²   23 25.06 kg/m²       BP Readings from Last 6 Encounters:   24 96/60   24 112/77   10/26/23 99/63   10/06/23 102/68   23 111/70   23 103/65         Review of Systems    See HPI above    Medical History:      Past Medical History:    Asthma (HCC)    Asthma due to environmental allergies (HCC)       Past Surgical History:   Procedure Laterality Date    Colonoscopy N/A 2022    Procedure: COLONOSCOPY;  Surgeon: Maicol Cody MD;  Location: [facility]      Other surgical history      excision of thigh lipoma          Current Outpatient Medications   Medication Sig Dispense Refill    oxybutynin ER 10 MG Oral Tablet 24 Hr Take 1 tablet (10 mg total) by mouth daily. 90 tablet 3    lidocaine 5 % External Patch Place 1 patch onto the skin daily. Apply to the right trapezius muscle by the neck. (Patient not taking: Reported on 2024) 30 patch 0    Ferrous Sulfate 325 (65 Fe) MG Oral Tab Take 1 tablet (325 mg  total) by mouth daily with breakfast. (Patient not taking: Reported on 5/31/2024) 30 tablet 0     Allergies:No Known Allergies     Physical Exam:       Physical Exam  Blood pressure 96/60, pulse 79, temperature 97 °F (36.1 °C), temperature source Temporal, weight 131 lb 8 oz (59.6 kg), not currently breastfeeding.  Constitutional: Patient is oriented to person, place, and time. Patient appears well-developed and well-nourished. No distress.       --------------------------------------------------------------  BACK:     Tenderness over the lumbar spine at L3 and L4 but no swelling or abnormality or break of the skin.  No paraspinal or buttock tenderness.  Deep Tendon Reflexes were 2 out of 4 bilateral lower extremity  Sensory Exam was intact  Good Strength with plantar flexion and dorsiflexion  Gait was normal.    Able to Flex Forward at the Waist and touch the ankles    FABERs Test is negative  Straight Leg Raise is negative bilaterally    ----------------------------------------------------------------    Vitals have been reviewed.           Assessment/Plan:      Diagnoses and all orders for this visit:    Contusion of back, unspecified laterality, initial encounter  -     XR LUMBAR SPINE (MIN 4 VIEWS) (CPT=72110); Future  The results below were reviewed and reported to the patient.  I did let her know that the arthritis is not work injuries.  There is no fractures.  She stated that the discomfort was really quite minimal and she may feel fine to go back to work regular duty on Friday and is so she does not feel she needs to follow-up with me.  XR LUMBAR SPINE (MIN 4 VIEWS) (CPT=72110)    Result Date: 7/30/2024  CONCLUSION:  1. Dextroscoliosis with multilevel degenerative disc disease/spondylosis and mild L5-S1 facet arthrosis.    Dictated by (CST): Madlonado Clarke MD on 7/30/2024 at 7:43 PM     Finalized by (CST): Maldonado Clarke MD on 7/30/2024 at 7:45 PM          XR CERVICAL SPINE (4VIEWS) (CPT=72050)    Result  Date: 7/30/2024  CONCLUSION:  1. Moderate multilevel degenerative disc disease/spondylosis.    Dictated by (CST): Maldonado Clarke MD on 7/30/2024 at 7:39 PM     Finalized by (CST): Maldonado Clarke MD on 7/30/2024 at 7:43 PM          Acute midline low back pain without sciatica  -     XR LUMBAR SPINE (MIN 4 VIEWS) (CPT=72110); Future        Referrals (if applicable)  No orders of the defined types were placed in this encounter.        Follow up if symptoms persist.  Take medicine (if given) as prescribed.  Approach to treatment discussed and patient/family member understands and agrees to plan.     No follow-ups on file.        Hansel Osborne DO  7/30/2024

## 2024-07-31 ENCOUNTER — TELEPHONE (OUTPATIENT)
Dept: FAMILY MEDICINE CLINIC | Facility: CLINIC | Age: 54
End: 2024-07-31

## 2024-07-31 NOTE — TELEPHONE ENCOUNTER
Patient's son Itzel called (on Release of Information), verified patient's Name and . States patient was seen yesterday in the office after an accident at work. She completed the x-rays (cervical spine and lumbar spine) yesterday. Son wants to know if the findings are caused by the accident.    Relayed that test results post immediately to [employer] account. However, provider has not reviewed it yet and we are awaiting for provider review and recommendations. He will be notified once recommendation is received. Verbalized understanding and had no further questions at this time.

## 2024-08-01 ENCOUNTER — TELEPHONE (OUTPATIENT)
Dept: FAMILY MEDICINE CLINIC | Facility: CLINIC | Age: 54
End: 2024-08-01

## 2024-08-01 NOTE — TELEPHONE ENCOUNTER
Patient's son Itzel on MARY would like  to write another return to work note for patient. Patient was seen on 7/30/24 and was suppose to return to  work on 8/2/24. Patient's son would like a new note for patient to return to work on 8/9/24. Itzel said patient is still in pain. Patient's son would like note to be put in mychart. Please advise

## 2024-08-05 NOTE — TELEPHONE ENCOUNTER
Patient's son called to follow up.  Advised that letter was approved and available [facility].  He verbalized understanding.

## 2024-08-08 ENCOUNTER — TELEPHONE (OUTPATIENT)
Dept: FAMILY MEDICINE CLINIC | Facility: CLINIC | Age: 54
End: 2024-08-08

## 2024-08-08 NOTE — TELEPHONE ENCOUNTER
Patients son called on the patients behalf requesting that the patients return to work letter is updated. He stated the patient is still in pain and wants to return back to work on 08/16/2024.

## 2024-08-09 ENCOUNTER — NURSE TRIAGE (OUTPATIENT)
Dept: FAMILY MEDICINE CLINIC | Facility: CLINIC | Age: 54
End: 2024-08-09

## 2024-08-09 DIAGNOSIS — R35.0 FREQUENCY OF URINATION: Primary | ICD-10-CM

## 2024-08-09 NOTE — TELEPHONE ENCOUNTER
Action Requested: Summary for Provider     []  Critical Lab, Recommendations Needed  [x] Need Additional Advice  []   FYI    []   Need Orders  [x] Need Medications Sent to Pharmacy  []  Other     SUMMARY: Spoke with patient, Date of Birth verified  She complaints of pain, freq, urgency with urination for 3 days, pain rate 7/10.  She denies blood in the urine, fever, no other symptom.    She never try any over the counter meds, she is looking for lab order and treatment.   She  was advised if symptom persist or gets worse to go to ER/ IC, stated she doesn't have much money for a visit.   She was informed Dr Osborne is out of office today, also no available appt today with other Providers,  strongly advised to go to [facility] for eval and treat, explained to patient office protocol, she stated she will see.   Routed to Dr Osborne for advise, thanks.              Reason for call: UTI  Onset: 3 days                     Reason for Disposition   Age > 50 years    Protocols used: Urination Pain - Female-A-OH

## 2024-08-10 NOTE — TELEPHONE ENCOUNTER
I am not in town.  Please have her go to the immediate care or see one of our providers on Monday that has an opening.

## 2024-08-11 NOTE — TELEPHONE ENCOUNTER
Get her in this week with myself or another provider as this is a Worker's Compensation case possibly and I am not sure why she is still in pain.

## 2024-08-12 ENCOUNTER — TELEPHONE (OUTPATIENT)
Dept: FAMILY MEDICINE CLINIC | Facility: CLINIC | Age: 54
End: 2024-08-12

## 2024-08-12 NOTE — TELEPHONE ENCOUNTER
Patient called regarding other telephone encounter.     Also was advised of message below. Will need to come in. Patient not sure if she can come in.

## 2024-08-12 NOTE — TELEPHONE ENCOUNTER
See telephone encounter from 8/12/24 --> son agreeable to taking patient to [facility] today for evaluation for possible UTI.     VENUS Osborne

## 2024-08-12 NOTE — TELEPHONE ENCOUNTER
[See 8/8/24 telephone encounter and 8/9/24 RN Triage encounter]    Son called states he requested extension for patient's time off work related to pain. He states patient is not in much pain now--> moderate, but cannot lift items at work. He is willing to bring patient in this Thursday at 1:30 pm [RES24] for visit for letter for work. I also asked if patient is still having urianary symptoms that were reported on 8/9/24 [RN Triage encounter] --> yes no change. He was advised to bring patient to Immediate Care today for evaluation and treatment for possible UTI as same day visit was not possible due to is work hours --> agreeable. It was explained that letting possible UTI continue without treatment may worsen symptoms and go further up the urinary tract that can propose other problems --> very agreeable and will bring patient to [facility] today. I made him aware I will convey the above to Dr. Osborne for potential visit on 8/15/24 Thursday at 1:30 pm. He verbalized understanding. No further questions or concerns at this time.    Dr. Osborne --> please advise if RES24 can be used on 8/15/24 at 1:30 pm for visit for letter for work

## 2024-08-12 NOTE — TELEPHONE ENCOUNTER
Dr. Osborne:   Patient called. I Advised her to come in. She is hoping you can order urine testing instead?    She said she has to try to find someone to bring her in office.   She is also requesting a letter to return to work on 8/16/24. I explained she will need to be seen in office first. No letter will be approved until seen.

## 2024-08-15 NOTE — TELEPHONE ENCOUNTER
I went ahead and gave her a letter stating she can return back to work on August 16, 2024 regular duty.

## 2024-08-20 ENCOUNTER — TELEPHONE (OUTPATIENT)
Dept: FAMILY MEDICINE CLINIC | Facility: CLINIC | Age: 54
End: 2024-08-20

## 2024-08-20 NOTE — TELEPHONE ENCOUNTER
Patients son is calling on her behalf stating that the patients employer is requesting all the information for her workers comp case. Per her son they are requesting copies of her office visit notes and medical records.. he is requesting that they are faxed to the following fax# with the claim# added.    Fax#: [phone]    Claim#: [id number]

## 2024-09-24 NOTE — TELEPHONE ENCOUNTER
Left message to call back- need to send in forms for us to have in writing, or need to contact Medical Records for all medical records.

## 2025-03-12 ENCOUNTER — TELEPHONE (OUTPATIENT)
Dept: FAMILY MEDICINE CLINIC | Facility: CLINIC | Age: 55
End: 2025-03-12

## 2025-03-12 NOTE — TELEPHONE ENCOUNTER
Please explain that the last time she had a physical exam was 2019.  I cannot just order labs on her without an appointment as I have to code labs correctly depending on what I find on the exam.  Please set her up for a physical exam either with myself or one of our partners.  Tell her to come in fasting if she wants to do labs that day after the visit.

## 2025-03-12 NOTE — TELEPHONE ENCOUNTER
Patient is requesting a general lab. Did not want to schedule physical or any appointment with Dr. Osborne. Only wants blood test. Last visit was 07/30/2024. Patient wants a call back

## 2025-03-12 NOTE — TELEPHONE ENCOUNTER
Spoke with patient, provided Dr. Osborne's message below. Patient will call back to schedule an appointment.

## 2025-03-28 ENCOUNTER — NURSE TRIAGE (OUTPATIENT)
Dept: FAMILY MEDICINE CLINIC | Facility: CLINIC | Age: 55
End: 2025-03-28

## 2025-03-28 ENCOUNTER — TELEMEDICINE (OUTPATIENT)
Dept: FAMILY MEDICINE CLINIC | Facility: CLINIC | Age: 55
End: 2025-03-28
Payer: COMMERCIAL

## 2025-03-28 DIAGNOSIS — J98.8 RESPIRATORY INFECTION: Primary | ICD-10-CM

## 2025-03-28 DIAGNOSIS — J11.1 INFLUENZA: ICD-10-CM

## 2025-03-28 PROCEDURE — 98005 SYNCH AUDIO-VIDEO EST LOW 20: CPT | Performed by: FAMILY MEDICINE

## 2025-03-28 RX ORDER — OSELTAMIVIR PHOSPHATE 75 MG/1
75 CAPSULE ORAL 2 TIMES DAILY
Qty: 10 CAPSULE | Refills: 0 | Status: SHIPPED | OUTPATIENT
Start: 2025-03-28 | End: 2025-04-02

## 2025-03-28 NOTE — PROGRESS NOTES
Virtual Telephone Check-In    Vivian Coyne verbally consents to a Virtual/Telephone Check-In service on 03/28/25.    Patient understands and accepts financial responsibility for any deductible, co-insurance and/or co-pays associated with this service.    Sick visit. Pt of Dr. Osborne    Duration of the service: 20 minutes  This telemedicine visit was conducted using live audio and video.     Summary of topics discussed:     Sick visit.  Per patient exposed to family members that would later diagnosed with influenza earlier in the week.  Her symptoms just started, sore throat, headache and malaise/fatigue.  Denies any chest pain shortness of breath or dizziness at the moment.  Can start taking Tamiflu.    Physical Exam:  General: alert, speaking in full sentences, no acute distress  Lungs: respirations sound unlabored, no audible wheezing with speaking.  Neurologic: alert, oriented x3    Assessment and plan:    1. Respiratory infection  - discussed supportive care, humidifier use, steam from the shower, teas and broths to help thin out mucous. Salt water gargles for throat pain. Tylenol PRN for pain and fever.     2. Influenza  Start tamiflu  - oseltamivir 75 MG Oral Cap; Take 1 capsule (75 mg total) by mouth 2 (two) times daily for 5 days.  Dispense: 10 capsule; Refill: 0      Advised to call back clinic if no improvement in symptoms. Red flags discussed to go to [facility].     Shell Jeffrey MD

## 2025-03-28 NOTE — TELEPHONE ENCOUNTER
Patient has headaches, sore pain. Body aches. Patient states her grandkids are sick.   Doesn't feel well enough to function at work. Requesting work excuse.   She works at [employer]  today, Tomorrow and Sunday.    Video visit Link scheduled today at 12:45pm.  Patient verbalized understanding.    Reason for Disposition   Patient wants to be seen    Protocols used: Sore Throat-A-OH

## 2025-03-29 ENCOUNTER — TELEPHONE (OUTPATIENT)
Dept: FAMILY MEDICINE CLINIC | Facility: CLINIC | Age: 55
End: 2025-03-29

## 2025-03-29 DIAGNOSIS — Z00.00 ANNUAL PHYSICAL EXAM: ICD-10-CM

## 2025-03-29 NOTE — TELEPHONE ENCOUNTER
Patient called and is asking if she can get a general blood test, I asked patient what the blood test was for and she said she hasn't had one in a while and would like to get one done.

## 2025-03-31 ENCOUNTER — OFFICE VISIT (OUTPATIENT)
Dept: FAMILY MEDICINE CLINIC | Facility: CLINIC | Age: 55
End: 2025-03-31

## 2025-03-31 VITALS
OXYGEN SATURATION: 95 % | SYSTOLIC BLOOD PRESSURE: 100 MMHG | BODY MASS INDEX: 22.26 KG/M2 | HEIGHT: 62 IN | DIASTOLIC BLOOD PRESSURE: 68 MMHG | HEART RATE: 89 BPM | WEIGHT: 121 LBS | TEMPERATURE: 98 F

## 2025-03-31 DIAGNOSIS — J22 ACUTE RESPIRATORY INFECTION: Primary | ICD-10-CM

## 2025-03-31 PROCEDURE — 99213 OFFICE O/P EST LOW 20 MIN: CPT

## 2025-03-31 PROCEDURE — 3074F SYST BP LT 130 MM HG: CPT

## 2025-03-31 PROCEDURE — 3008F BODY MASS INDEX DOCD: CPT

## 2025-03-31 PROCEDURE — 3078F DIAST BP <80 MM HG: CPT

## 2025-03-31 NOTE — PROGRESS NOTES
Subjective:   Vivian Coyne is a 54 year old female who presents for Body ache and/or chills (Pt states she has been having, body aches, fatigue, sore throat, back and stomach pain and pt states she also has been having fevers since 3/27).      Patient presents with upper respiratory symptoms for the past 5 days  Fever - Yes, Tmax (100.9)  Sick contacts - Yes  Travel - No  Other symptoms - sore throat, congestion, clear colored nasal discharge, fatigue, chills, postnasal drip, rhinorrhea, headaches, sneezing cough with clear colored sputum, cough is keeping pt up at night, chest pain from coughing, SOB, abdominal pain, and nausea.  Patient states OTC cold meds have not been helping.  Patient denies ear pain, sinus pressure/pain, chest pain, diarrhea, vomiting, and dizziness.      History/Other:      Chief Complaint Reviewed and Verified  Nursing Notes Reviewed and   Verified  Tobacco Reviewed  Allergies Reviewed  Medications Reviewed    Problem List Reviewed  Medical History Reviewed  Surgical History   Reviewed  OB Status Reviewed  Family History Reviewed           Tobacco:  She has never smoked tobacco.      Current Outpatient Medications   Medication Sig Dispense Refill    oseltamivir 75 MG Oral Cap Take 1 capsule (75 mg total) by mouth 2 (two) times daily for 5 days. (Patient not taking: Reported on 3/31/2025) 10 capsule 0    lidocaine 5 % External Patch Place 1 patch onto the skin daily. Apply to the right trapezius muscle by the neck. (Patient not taking: Reported on 3/31/2025) 30 patch 0    oxybutynin ER 10 MG Oral Tablet 24 Hr Take 1 tablet (10 mg total) by mouth daily. (Patient not taking: Reported on 3/31/2025) 90 tablet 3    Ferrous Sulfate 325 (65 Fe) MG Oral Tab Take 1 tablet (325 mg total) by mouth daily with breakfast. (Patient not taking: Reported on 3/31/2025) 30 tablet 0       Allergies[1]      Review of Systems   Constitutional:  Positive for chills, fatigue and fever. Negative for  activity change.   HENT:  Positive for congestion, postnasal drip, rhinorrhea, sneezing and sore throat. Negative for ear pain.    Eyes: Negative.  Negative for redness.   Respiratory:  Positive for cough and shortness of breath. Negative for wheezing.    Cardiovascular: Negative.  Negative for chest pain.   Gastrointestinal:  Positive for abdominal pain and nausea. Negative for constipation, diarrhea and vomiting.   Endocrine: Negative.    Genitourinary: Negative.  Negative for difficulty urinating and frequency.   Musculoskeletal: Negative.  Negative for back pain, joint swelling and myalgias.   Skin: Negative.  Negative for rash.   Allergic/Immunologic: Negative.    Neurological:  Positive for headaches. Negative for dizziness, syncope and light-headedness.   Hematological: Negative.    Psychiatric/Behavioral: Negative.           Objective:   /68 (BP Location: Right arm, Patient Position: Sitting, Cuff Size: adult)   Pulse 89   Temp 97.7 °F (36.5 °C) (Temporal)   Ht 5' 2\" (1.575 m)   Wt 121 lb (54.9 kg)   SpO2 95%   BMI 22.13 kg/m²  Estimated body mass index is 22.13 kg/m² as calculated from the following:    Height as of this encounter: 5' 2\" (1.575 m).    Weight as of this encounter: 121 lb (54.9 kg).      Physical Exam  Vitals and nursing note reviewed.   Constitutional:       Appearance: Normal appearance. She is normal weight.   HENT:      Head: Normocephalic and atraumatic.      Right Ear: Tympanic membrane normal.      Left Ear: Tympanic membrane normal.      Nose: Congestion present.      Mouth/Throat:      Pharynx: Posterior oropharyngeal erythema present.   Eyes:      Extraocular Movements: Extraocular movements intact.      Conjunctiva/sclera: Conjunctivae normal.      Pupils: Pupils are equal, round, and reactive to light.   Cardiovascular:      Rate and Rhythm: Normal rate and regular rhythm.      Pulses: Normal pulses.      Heart sounds: Normal heart sounds.   Pulmonary:      Effort:  Pulmonary effort is normal.      Breath sounds: Normal breath sounds.   Abdominal:      General: Abdomen is flat. Bowel sounds are normal.      Palpations: Abdomen is soft.   Musculoskeletal:         General: Normal range of motion.      Cervical back: Normal range of motion and neck supple.   Skin:     General: Skin is warm and dry.   Neurological:      General: No focal deficit present.      Mental Status: She is alert and oriented to person, place, and time. Mental status is at baseline.   Psychiatric:         Mood and Affect: Mood normal.         Behavior: Behavior normal.         Thought Content: Thought content normal.         Judgment: Judgment normal.           Assessment & Plan:     Assessment & Plan  Acute respiratory infection  - COVID, flu, RSV swab done and sent out for culture  -Drink plenty of fluids to stay hydrated - at least 2 liters per day   -Wash hands frequently, cover your cough or sneeze, do not share drinks with others.  -Cepacol lozenges, Chloroseptic throat spray, and salt water gargles (1 tsp salt in 8 oz lukewarm water) may help with throat pain and swelling  -Humidifier, steam, and Vicks as needed  -OTC Nasacort or Flonase (steroid nasal spray) - 2 sprays in each nostril once daily if needed for severe nasal congestion and post nasal drip.  -Tylenol or Ibuprofen as needed for any pain or fever  -May use over the counter antitussives like Robitussin or Delsym as needed for cough       Orders:    SARS-CoV-2/Flu A and B/RSV by PCR ([facility]); Future       Medication use, effects and side effects discussed in detail with patient. The patient  indicated understanding of the diagnosis and agreed with the plan of care.    Return if symptoms worsen or fail to improve.    BORA Crawford       [1]   Allergies  Allergen Reactions    Oseltamivir Tightness in Throat

## 2025-04-01 LAB
FLUAV + FLUBV RNA SPEC NAA+PROBE: DETECTED
FLUAV + FLUBV RNA SPEC NAA+PROBE: NOT DETECTED
RSV RNA SPEC NAA+PROBE: NOT DETECTED
SARS-COV-2 RNA RESP QL NAA+PROBE: NOT DETECTED

## 2025-04-02 ENCOUNTER — TELEPHONE (OUTPATIENT)
Dept: FAMILY MEDICINE CLINIC | Facility: CLINIC | Age: 55
End: 2025-04-02

## 2025-04-02 NOTE — TELEPHONE ENCOUNTER
Adamaris CHAMBERS:   Patient called stating she still does not feel well to return to work tomorrow. Patient asking if she can be off 3 more days and return 4/7/25.    Patient continues with bodyaches, headaches, fatigue.     Denied short of breathe, no chest pain. Cough is intermittent.       Pended letter if you approve. Please sign.

## 2025-04-03 ENCOUNTER — NURSE TRIAGE (OUTPATIENT)
Dept: FAMILY MEDICINE CLINIC | Facility: CLINIC | Age: 55
End: 2025-04-03

## 2025-04-03 NOTE — TELEPHONE ENCOUNTER
Adamaris Carlson, APRN ===FYI .     Patient's son called (general information only ) , verified patient's name and date of birth.  3 way call with the patient and patient provided phone consent to talk to her son.   Per son, the patient is experiencing severe abdominal pain that is preventing her from moving. Whole abdomen and current pain level is  8/10. There is no fever, but the patient has had three watery episodes of diarrhea today.   Denied black stool or bleeding .   Abdomen is soft.   No issues with urinating .     RN instructed the patient and son to go to emergency department now,he is planning to bring her to [facility] ED.       3/31/25 visit note of Adamaris.   Subjective:  Vivian Coyne is a 54 year old female who presents for Body ache and/or chills (Pt states she has been having, body aches, fatigue, sore throat, back and stomach pain and pt states she also has been having fevers since 3/27).    Reason for Disposition   SEVERE abdominal pain (e.g., excruciating)    Protocols used: Abdominal Pain - Female-A-[place]

## 2025-04-04 NOTE — TELEPHONE ENCOUNTER
Called patient in regards to message below ( identified name and date of birth )   Patient did not go to the ER, as the pain subsided and diarrhea has decreased    She is feeling better today, no abdominal pain right now     Advised to call back if symptoms and/ or condition worsens  Patient verbalizes understanding and agrees with plan.     Routing as For Your Information

## 2025-04-04 NOTE — TELEPHONE ENCOUNTER
Son/Itzel (Last signed Verbal Release verified) contacted and made aware of letter sent, he states the letter was received. He verbalized understanding. No further questions or concerns at this time.

## 2025-04-07 ENCOUNTER — OFFICE VISIT (OUTPATIENT)
Dept: FAMILY MEDICINE CLINIC | Facility: CLINIC | Age: 55
End: 2025-04-07

## 2025-04-07 ENCOUNTER — NURSE TRIAGE (OUTPATIENT)
Dept: FAMILY MEDICINE CLINIC | Facility: CLINIC | Age: 55
End: 2025-04-07

## 2025-04-07 ENCOUNTER — LAB ENCOUNTER (OUTPATIENT)
Dept: LAB | Age: 55
End: 2025-04-07
Attending: FAMILY MEDICINE
Payer: COMMERCIAL

## 2025-04-07 VITALS
HEART RATE: 71 BPM | HEIGHT: 62 IN | DIASTOLIC BLOOD PRESSURE: 68 MMHG | WEIGHT: 120 LBS | BODY MASS INDEX: 22.08 KG/M2 | TEMPERATURE: 98 F | SYSTOLIC BLOOD PRESSURE: 102 MMHG

## 2025-04-07 DIAGNOSIS — J10.1 INFLUENZA B: ICD-10-CM

## 2025-04-07 DIAGNOSIS — R53.83 LETHARGY: ICD-10-CM

## 2025-04-07 DIAGNOSIS — R19.7 DIARRHEA, UNSPECIFIED TYPE: Primary | ICD-10-CM

## 2025-04-07 DIAGNOSIS — R19.7 DIARRHEA, UNSPECIFIED TYPE: ICD-10-CM

## 2025-04-07 LAB
ALBUMIN SERPL-MCNC: 4.3 G/DL (ref 3.2–4.8)
ALBUMIN/GLOB SERPL: 1.7 {RATIO} (ref 1–2)
ALP LIVER SERPL-CCNC: 80 U/L
ALT SERPL-CCNC: 20 U/L
ANION GAP SERPL CALC-SCNC: 9 MMOL/L (ref 0–18)
AST SERPL-CCNC: 22 U/L (ref ?–34)
BASOPHILS # BLD AUTO: 0.02 X10(3) UL (ref 0–0.2)
BASOPHILS NFR BLD AUTO: 0.4 %
BILIRUB SERPL-MCNC: 0.5 MG/DL (ref 0.3–1.2)
BUN BLD-MCNC: 17 MG/DL (ref 9–23)
BUN/CREAT SERPL: 24.6 (ref 10–20)
CALCIUM BLD-MCNC: 9.2 MG/DL (ref 8.7–10.4)
CHLORIDE SERPL-SCNC: 106 MMOL/L (ref 98–112)
CO2 SERPL-SCNC: 27 MMOL/L (ref 21–32)
CREAT BLD-MCNC: 0.69 MG/DL
DEPRECATED RDW RBC AUTO: 39.3 FL (ref 35.1–46.3)
EGFRCR SERPLBLD CKD-EPI 2021: 102 ML/MIN/1.73M2 (ref 60–?)
EOSINOPHIL # BLD AUTO: 0.12 X10(3) UL (ref 0–0.7)
EOSINOPHIL NFR BLD AUTO: 2.3 %
ERYTHROCYTE [DISTWIDTH] IN BLOOD BY AUTOMATED COUNT: 12.1 % (ref 11–15)
FASTING STATUS PATIENT QL REPORTED: NO
GLOBULIN PLAS-MCNC: 2.6 G/DL (ref 2–3.5)
GLUCOSE BLD-MCNC: 101 MG/DL (ref 70–99)
HCT VFR BLD AUTO: 43.3 %
HGB BLD-MCNC: 13.8 G/DL
IMM GRANULOCYTES # BLD AUTO: 0.01 X10(3) UL (ref 0–1)
IMM GRANULOCYTES NFR BLD: 0.2 %
LYMPHOCYTES # BLD AUTO: 1.34 X10(3) UL (ref 1–4)
LYMPHOCYTES NFR BLD AUTO: 25.2 %
MCH RBC QN AUTO: 28.1 PG (ref 26–34)
MCHC RBC AUTO-ENTMCNC: 31.9 G/DL (ref 31–37)
MCV RBC AUTO: 88.2 FL
MONOCYTES # BLD AUTO: 0.39 X10(3) UL (ref 0.1–1)
MONOCYTES NFR BLD AUTO: 7.3 %
NEUTROPHILS # BLD AUTO: 3.44 X10 (3) UL (ref 1.5–7.7)
NEUTROPHILS # BLD AUTO: 3.44 X10(3) UL (ref 1.5–7.7)
NEUTROPHILS NFR BLD AUTO: 64.6 %
OSMOLALITY SERPL CALC.SUM OF ELEC: 296 MOSM/KG (ref 275–295)
PLATELET # BLD AUTO: 162 10(3)UL (ref 150–450)
POTASSIUM SERPL-SCNC: 4 MMOL/L (ref 3.5–5.1)
PROT SERPL-MCNC: 6.9 G/DL (ref 5.7–8.2)
RBC # BLD AUTO: 4.91 X10(6)UL
SODIUM SERPL-SCNC: 142 MMOL/L (ref 136–145)
WBC # BLD AUTO: 5.3 X10(3) UL (ref 4–11)

## 2025-04-07 PROCEDURE — 99214 OFFICE O/P EST MOD 30 MIN: CPT | Performed by: FAMILY MEDICINE

## 2025-04-07 PROCEDURE — 87177 OVA AND PARASITES SMEARS: CPT

## 2025-04-07 PROCEDURE — 87209 SMEAR COMPLEX STAIN: CPT

## 2025-04-07 PROCEDURE — 3074F SYST BP LT 130 MM HG: CPT | Performed by: FAMILY MEDICINE

## 2025-04-07 PROCEDURE — 87015 SPECIMEN INFECT AGNT CONCNTJ: CPT

## 2025-04-07 PROCEDURE — 87427 SHIGA-LIKE TOXIN AG IA: CPT

## 2025-04-07 PROCEDURE — 87046 STOOL CULTR AEROBIC BACT EA: CPT

## 2025-04-07 PROCEDURE — 80053 COMPREHEN METABOLIC PANEL: CPT

## 2025-04-07 PROCEDURE — 87493 C DIFF AMPLIFIED PROBE: CPT

## 2025-04-07 PROCEDURE — 85025 COMPLETE CBC W/AUTO DIFF WBC: CPT

## 2025-04-07 PROCEDURE — 3008F BODY MASS INDEX DOCD: CPT | Performed by: FAMILY MEDICINE

## 2025-04-07 PROCEDURE — 87045 FECES CULTURE AEROBIC BACT: CPT

## 2025-04-07 PROCEDURE — 36415 COLL VENOUS BLD VENIPUNCTURE: CPT

## 2025-04-07 PROCEDURE — 3078F DIAST BP <80 MM HG: CPT | Performed by: FAMILY MEDICINE

## 2025-04-07 PROCEDURE — G2211 COMPLEX E/M VISIT ADD ON: HCPCS | Performed by: FAMILY MEDICINE

## 2025-04-07 RX ORDER — DIPHENOXYLATE HYDROCHLORIDE AND ATROPINE SULFATE 2.5; .025 MG/1; MG/1
TABLET ORAL
Qty: 40 TABLET | Refills: 0 | Status: SHIPPED | OUTPATIENT
Start: 2025-04-07

## 2025-04-07 NOTE — PROGRESS NOTES
Patient ID: Vivian Coyne is a 54 year old female.         The following individual(s) verbally consented to be recorded using ambient AI listening technology and understand that they can each withdraw their consent to this listening technology at any point by asking the clinician to turn off or pause the recording:    Patient name: Vivian Coyne  Additional names:           HPI  Chief Complaint   Patient presents with    Diarrhea     X 5 days     Lo Steve, RN   Registered Nurse     Telephone Encounter     Signed     Encounter Date: 4/3/2025     Signed         Adamaris Carlson APRN ===FYI .      Patient's son called (general information only ) , verified patient's name and date of birth.  3 way call with the patient and patient provided phone consent to talk to her son.   Per son, the patient is experiencing severe abdominal pain that is preventing her from moving. Whole abdomen and current pain level is  8/10. There is no fever, but the patient has had three watery episodes of diarrhea today.   Denied black stool or bleeding .   Abdomen is soft.   No issues with urinating .      RN instructed the patient and son to go to emergency department now,he is planning to bring her to [facility] ED.         3/31/25 visit note of Adamaris.   Subjective:  Vivian Coyne is a 54 year old female who presents for Body ache and/or chills (Pt states she has been having, body aches, fatigue, sore throat, back and stomach pain and pt states she also has been having fevers since 3/27).     Reason for Disposition   SEVERE abdominal pain (e.g., excruciating)    Protocols used: Abdominal Pain - Female-A-[place]              Electronically signed by Lo Steve RN at 4/3/2025  3:32 PM         Nurse Triage on 4/3/2025            Detailed Report          Note viewed by patient  History of Present Illness  The patient presents with persistent diarrhea and fatigue following a recent influenza B infection.      She has been experiencing persistent diarrhea since April 3rd, following a recent influenza B infection. The diarrhea is watery, occurring throughout the day and multiple times at night, with four or more episodes last night. An over-the-counter antidiarrheal pill taken before sleep did not alleviate the symptoms. There is a history of long-term diarrhea issues, with previous stool tests showing no significant findings. No blood or black stool is present, and there are no urinary issues.    She was diagnosed with influenza B on March 31st, experiencing body aches, headaches, and fatigue, but no shortness of breath or chest pain. These symptoms persisted until April 2nd.     She mentions weight loss since a trip to [facility] in September for dental work, where she was prescribed Prevacid, which she believes may have contributed to her decreased appetite and subsequent weight loss. The weight loss may also be related to the chronic diarrhea.    She feels fatigued and lacks energy, attributing this to the frequent diarrhea. She has difficulty working due to her condition, stating, 'I'm sitting all the pain in the bed.' She also reports pain in unspecified areas, likely related to her frequent bowel movements.    She returned from another trip to [facility] on March 3rd. Her travel history may be relevant to her current symptoms.    Wt Readings from Last 6 Encounters:   04/07/25 120 lb (54.4 kg)   03/31/25 121 lb (54.9 kg)   07/30/24 131 lb 8 oz (59.6 kg)   05/31/24 131 lb (59.4 kg)   10/26/23 134 lb (60.8 kg)   10/06/23 136 lb (61.7 kg)       BMI Readings from Last 6 Encounters:   04/07/25 21.95 kg/m²   03/31/25 22.13 kg/m²   07/30/24 24.05 kg/m²   05/31/24 23.96 kg/m²   10/26/23 24.51 kg/m²   10/06/23 24.87 kg/m²       BP Readings from Last 6 Encounters:   04/07/25 102/68   03/31/25 100/68   07/30/24 96/60   05/31/24 112/77   10/26/23 99/63   10/06/23 102/68       Results      Review of Systems  No exertional cardiac  chest pain or shortness of breath unless stated in HPI which would take precedence.  See HPI for further review of systems.        Medical History:      Past Medical History:    Asthma (HCC)    Asthma due to environmental allergies (HCC)       Past Surgical History:   Procedure Laterality Date    Colonoscopy N/A 2022    Procedure: COLONOSCOPY;  Surgeon: Maicol Cody MD;  Location: [facility]      Other surgical history      excision of thigh lipoma          Current Outpatient Medications   Medication Sig Dispense Refill    lidocaine 5 % External Patch Place 1 patch onto the skin daily. Apply to the right trapezius muscle by the neck. (Patient not taking: Reported on 2024) 30 patch 0    oxybutynin ER 10 MG Oral Tablet 24 Hr Take 1 tablet (10 mg total) by mouth daily. (Patient not taking: Reported on 2025) 90 tablet 3    Ferrous Sulfate 325 (65 Fe) MG Oral Tab Take 1 tablet (325 mg total) by mouth daily with breakfast. (Patient not taking: Reported on 2024) 30 tablet 0     Allergies:Allergies[1]     Physical Exam:       Physical Exam  Blood pressure 102/68, pulse 71, temperature 97.5 °F (36.4 °C), temperature source Tympanic, height 5' 2\" (1.575 m), weight 120 lb (54.4 kg), not currently breastfeeding.       Physical Exam   Constitutional: Patient is oriented to person, place, and time. Patient appears well-developed and well-nourished.   HENT:   Mouth/Throat: Mucous membranes are normal.  Moist oral mucosa  Neck: Normal range of motion. Neck supple. No thyromegaly   Cardiovascular: Normal rate, regular rhythm and normal heart sounds.    Pulmonary/Chest: Effort normal and breath sounds normal. No respiratory distress.   Abdominal: No tenderness.  Hyperactive bowel sounds.  Abdomen is soft and no splenomegaly.  There is no guarding.  Neurological: Patient is alert and oriented to person, place, and time.   Skin: Skin is warm and dry.   Psychiatric: Patient has a normal mood and affect.    Vitals reviewed.      Physical Exam    CARDIOVASCULAR: Heart sounds normal. Radial and pedal pulses strong.  ABDOMEN: Hyperactive bowel sounds. Abdomen soft. No splenomegaly.  EXTREMITIES: Capillary refill normal.      Assessment/Plan:        Diagnoses and all orders for this visit:    Diarrhea, unspecified type  -     CBC With Differential With Platelet; Future  -     Comp Metabolic Panel (14); Future  -     Stool Culture [E]; Future  -     C. diff toxigenic PCR (OPT) [E]; Future  -     Ova and Parasites, traveler outside of country; Future  -     diphenoxylate-atropine (LOMOTIL) 2.5-0.025 MG Oral Tab; 1 or 2 tablets by mouth 3 or 4  times daily as needed for diarrhea.  She is not dehydrated.  Will get a start her on Lomotil but I need to do lab tests and stool tests on her.  She does not look so lethargic that she needs to go to the hospital.  Lethargy  -     CBC With Differential With Platelet; Future  -     Comp Metabolic Panel (14); Future  -     Stool Culture [E]; Future  -     C. diff toxigenic PCR (OPT) [E]; Future  -     Ova and Parasites, traveler outside of country; Future    Influenza B  This looks to be resolved.      Referrals (if applicable)  No orders of the defined types were placed in this encounter.        Follow up if symptoms persist.  Take medicine (if given) as prescribed.  Approach to treatment discussed and patient/family member understands and agrees to plan.     No follow-ups on file.      Assessment & Plan  Chronic Diarrhea  Persistent diarrhea since April 3, with multiple episodes day and night, including four episodes last night. Chronic diarrhea since May last year, with intermittent episodes, accompanied by fatigue and weight loss. No blood or melena, and no recent travel except return from [facility] on March 3. Differential diagnosis includes infectious causes such as C. difficile, given recent influenza B and antibiotic use. Normal colonoscopy in July 2022. Stool cultures and blood  work are necessary to identify underlying causes. Prescription medication is needed to manage symptoms while awaiting test results.  - Order stool cultures for C. difficile and other pathogens  - Prescribe medication for diarrhea  - Advise discontinuation of over-the-counter antidiarrheal medications  - Order blood work to assess underlying causes  - Consider referral to gastroenterologist if symptoms persist    Influenza B  Diagnosed with influenza B on March 31 with body aches, headaches, and fatigue. Acute phase resolved, but residual fatigue persists, likely exacerbated by ongoing diarrhea.  - Provide supportive care and monitor for resolution of fatigue    Hansel Osborne DO  4/7/2025        [1]   Allergies  Allergen Reactions    Oseltamivir Tightness in Throat

## 2025-04-07 NOTE — TELEPHONE ENCOUNTER
Action Requested: Summary for Provider     []  Critical Lab, Recommendations Needed  [] Need Additional Advice  [x]   FYI    []   Need Orders  [] Need Medications Sent to Pharmacy  []  Other     SUMMARY: Severe diarrhea since onset, about 7-8 times per day. Some dryness of mouth at night, inadequate fluid intake, urinating normally - will increase fluids. + influenza on 3/31/25. Same day office visit offered--> agreeable and scheduled. [See below for more details]     Reason for call: Diarrhea  Onset: 4/3/25    Reason for Disposition   SEVERE diarrhea (e.g., 7 or more times / day more than normal) and present > 24 hours (1 day)    Protocols used: Diarrhea-A-[place]      Patient called states she has had diarrhea since onset, approximate 7-8 times a day. Denies any blood/black stool. She denies any nausea or vomiting. Denies any abdominal pain. She drinks about 16-24 oz a day. Mouth is dry at night. She is urinating normally with normal urine output. Denies any fever/chills. She tested + influenza on 3/31/25, recently she was advised to go to Emergency Department for severe abdominal pain [which has subsided] and declined. Refer to system/assessment yes/no answers. Same day office visit offered --> agreeable and scheduled, advised mask to be worn at office visit - agreeable. Home Care Advice discussed, per protocol. Patient instructed any new or worsening symptoms [reviewed] seek immediate medical attention-->Immediate Care or Emergency Department. Patient verbalized understanding. No further questions or concerns at this time.    Future Appointments   Date Time Provider Department Center   4/7/2025  9:30 AM Hansel Osborne DO [facility]

## 2025-04-08 ENCOUNTER — TELEPHONE (OUTPATIENT)
Dept: FAMILY MEDICINE CLINIC | Facility: CLINIC | Age: 55
End: 2025-04-08

## 2025-04-08 LAB — C DIFF TOX B STL QL: NEGATIVE

## 2025-04-08 NOTE — TELEPHONE ENCOUNTER
Son, Davey calling for Patient. Verified Patient's name and date of birth.  Per Davey, they received blood work results, but not the stool tests.  Reviewed labs, stool tests currently in process.   Informed Davey, stool tests takes longer to process than blood work.  Advised will contact Patient once results have been finalized.  Verbalized understanding.

## 2025-04-09 NOTE — TELEPHONE ENCOUNTER
Received disability forms from office. No valid authorization attached. Sending patient MyChart questionnaire for Release of Information. Logged for processing.

## 2025-04-10 NOTE — TELEPHONE ENCOUNTER
Patient's son Itzel (on HIPAA) is calling with concerns due to patient's abdominal pain. Per her son today she had no diarrhea, no fever and is resting. He reviewed results via her my chart and was concerned about the BUN/ Creat Ratio being a bit elevated. Advised sometimes this can be from dehydration. Advised the following:      CBC shows no anemia.  White blood cell count is not elevated. Kidney function and liver function are normal.  No diabetes.  Still awaiting other lab work.     Her son is concerned about her possibly having Cancer. He is wondering if she needs to see a Gastroenterologist. Advised her colonoscopy done in 2022 was within normal limit's. He was concerned that she had polyps. Explained that they were removed and pathology didn't show cancer. He felt better hearing this.     Advised to have her do a BRAT diet, push fluids and if she can to drink Gatorade and we will call him with other pending results.     Please advise

## 2025-04-11 NOTE — TELEPHONE ENCOUNTER
Patient returned call:    Type of Leave: continuous  Reason for Leave: Acute respiratory infection, Influenza  Start date of leave: 3/28/25  End date of leave: 4/14/25, Return to work 4/15/25 no restrictions  How many flare ups per month/length?:  How many appts per month/length?:   Was Fee and Turnaround info Given?:     Letters support continuous leave dates.

## 2025-04-11 NOTE — TELEPHONE ENCOUNTER
Left message to call back need details regarding disability forms received. Should patient call back please task provider.

## 2025-04-15 NOTE — PROGRESS NOTES
[facility] - Gastroenterology                                                                                                               Reason for consult: eval    Requesting physician or provider: Hansel Osborne DO    Chief Complaint   Patient presents with    Bloating     Constipation and diarrhea per patient       HPI:   Vivian Coyne is a 54 year old year-old female with history of asthma:    she is here today for diarrhea  Her preferred language is Haitian and an  was called  Her son is here with her and pt would like him to interpret    Sx since at least 5/2024 (see pcp note 5/31/24)    C.diff, culture, o&p neg (4/2024)  Cmp, cbc unremarkable (4/2024)    H/o padmini 4/2023  Improved 2/2024  She is not currently on oral iron supplement    She has both constipation and diarrhea.   When having diarrhea may have 7-8 bms in day w/ urgency and nocturnal bm.  No brbpr, mucus, melena.  Can last mos at a time. Has happened 2-3x/year.  Prior to most recent sx had influenza B.  Sx lasted 1 week. Sx resolved spontaneously.   Baseline bm 4-5 days without bm.     she denies acid reflux and/or heartburn. she denies dysphagia, odynophagia and/or globus. she describes upper abd pain x last year. Can be worse after eating.  Improves with bm. she denies nausea and/or vomiting. Has had weight loss over last year. Weight today stable from comparison    NSAIDS: ibuprofen daily with flu  Tobacco: no  Alcohol: no  Marijuana: no  Illicit drugs: no    No FH GI malignancy, IBD, celiac  No fhx pancreatic disease    No history of adverse reaction to sedation  No EDSON  No anticoagulants  No pacemaker/defibrillator  No pain medications and/or sleep aides    Unremarkable ultrasound abd 5/2023  Impression   CONCLUSION:  Unremarkable abdominal ultrasound.               Last colonoscopy: 7/2022    Findings:   1. 2 polyp(s) noted as follows:      A. 4 mm polyp in the ascending colon; flat  morphology; cold biopsy polypectomy and retrieved.      B. 4 mm polyp in the sigmoid colon; flat morphology; cold biopsy polypectomy and retrieved.        2. Diverticulosis: none noted.     3. Terminal ileum: the visualized mucosa appeared normal.     4. A retroflexed view of the rectum revealed hemorrhoids.     5. The colonic mucosa throughout the colon showed normal vascular pattern, without evidence of angioectasias or inflammation.      6. CHAPO: normal rectal tone, no masses palpated.         Recommend:  Await pathology. The interval for the next colonoscopy will be determined after reviewing pathology. If new signs or symptoms develop, colonoscopy may need to be repeated sooner.   High fiber diet.  Monitor for blood in the stool. If having more than just tinge of blood, call office or go to the ER.     Final Diagnosis:      A. Ascending colon polyp:  Tubular adenoma.     B. Sigmoid colon polyp:  Fragments of tubular adenoma.       Last EGD: no    Wt Readings from Last 6 Encounters:   04/16/25 123 lb (55.8 kg)   04/07/25 120 lb (54.4 kg)   03/31/25 121 lb (54.9 kg)   07/30/24 131 lb 8 oz (59.6 kg)   05/31/24 131 lb (59.4 kg)   10/26/23 134 lb (60.8 kg)        History, Medications, Allergies, ROS:      Past Medical History[1]   Past Surgical History[2]   Family Hx: Family History[3]   Social History: Short Social Hx on File[4]     Medications (Active prior to today's visit):  Current Medications[5]    Allergies:  Allergies[6]    ROS:   CONSTITUTIONAL: negative for fevers, chills, sweats and weight loss  EYES Negative for red eyes, yellow eyes, changes in vision  HEENT: Negative for dysphagia and hoarseness  RESPIRATORY: Negative for cough and shortness of breath  CARDIOVASCULAR: Negative for chest pain, palpitations  GASTROINTESTINAL: See HPI  GENITOURINARY: Negative for dysuria and frequency  MUSCULOSKELETAL: Negative for arthralgias and myalgias  NEUROLOGICAL: Negative for dizziness and  headaches  BEHAVIOR/PSYCH: Negative for anxiety and poor appetite    PHYSICAL EXAM:   Blood pressure 116/67, pulse 66, height 5' 2\" (1.575 m), weight 123 lb (55.8 kg), not currently breastfeeding.    GEN: WD/WN, NAD  HEENT: Supple symmetrical, trachea midline  CV: RRR, the extremities are warm and well perfused   LUNGS: No increased work of breathing  ABDOMEN: No scars, normal bowel sounds, soft, non-tender, non-distended no rebound or guarding, no masses, no hepatomegaly  MSK: No redness, no warmth, no swelling of joints  SKIN: No jaundice, no erythema, no rashes  HEMATOLOGIC: No bleeding, no bruising  NEURO: Alert and interactive, normal gait    Labs/Imaging/Procedures:     Patient's pertinent labs and imaging were reviewed and discussed with patient today.        .  ASSESSMENT/PLAN:   Vivian Coyne is a 54 year old year-old female with history of asthma:    #Diarrhea  #weight loss  #epigastric  Stool testing, labs unremarkable (4/2025). Has baseline of constipation.  No brbpr, melena.  Has epigastric pain worse after eating. Sx improved with bm. No significant gerd, n/v.  Weight down over last year, however stable from most recent comparison.  Cln 2022. Has not had egd. No recent cross-sectional imaging on file. Plan as below. She endorses use of of nsaids.  We discussed repeat cln. However she does not want to have repeat c-scope.  Recommendations below:  -avoid nsaids  -reflux diet modification  -ct scan  -stool testing  -start pantoprazole AFTER stool testing  -continue to monitor for need for repeat cln earlier than recommended screening interval  -egd    Egd w/ zahra w/ Dr. Cody  Dx: diarrhea, epigastric pain, weight loss        Orders This Visit:  Orders Placed This Encounter   Procedures    Calprotectin, Fecal    H. Pylori Stool Ag, EIA       Meds This Visit:  Requested Prescriptions     Signed Prescriptions Disp Refills    pantoprazole 40 MG Oral Tab EC 30 tablet 3     Sig: Take 1 tablet (40 mg total)  by mouth daily.       Imaging & Referrals:  CT ABDOMEN+PELVIS(CONTRAST ONLY)(CPT=74177)    ENDOSCOPIC RISK BENEFIT DISCUSSION: I described the procedure in great detail with the patient. I discussed the risks and benefits, including but not limited to: bleeding, perforation, infection, anesthesia complications, and even death. Patient will be NPO after midnight and will have a person physically present at time of pick-up to drive patient home. Patient verbalized understanding and agrees to proceed with procedure as planned.      Lisa Burnett, APRN   4/15/2025        This note was partially prepared using Dragon Medical voice recognition dictation software. As a result, errors may occur. When identified, these errors have been corrected. While every attempt is made to correct errors during dictation, discrepancies may still exist.          [1]   Past Medical History:   Asthma (HCC)    Asthma due to environmental allergies (HCC)   [2]   Past Surgical History:  Procedure Laterality Date    Colonoscopy N/A 2022    Procedure: COLONOSCOPY;  Surgeon: Maicol Cody MD;  [facility]      Other surgical history      excision of thigh lipoma   [3]   Family History  Problem Relation Age of Onset    Cancer Paternal Cousin Female 28        colon cancer   [4]   Social History  Socioeconomic History    Marital status:    Tobacco Use    Smoking status: Never     Passive exposure: Never    Smokeless tobacco: Never   Vaping Use    Vaping status: Never Used   Substance and Sexual Activity    Alcohol use: No    Drug use: No   Other Topics Concern    Caffeine Concern Yes     Comment: 1 CUP COFFEE DAILY    Reaction to local anesthetic No   [5]   Current Outpatient Medications   Medication Sig Dispense Refill    pantoprazole 40 MG Oral Tab EC Take 1 tablet (40 mg total) by mouth daily. 30 tablet 3    diphenoxylate-atropine (LOMOTIL) 2.5-0.025 MG Oral Tab 1 or 2 tablets by mouth 3 or 4  times daily as  needed for diarrhea. (Patient not taking: Reported on 4/16/2025) 40 tablet 0    lidocaine 5 % External Patch Place 1 patch onto the skin daily. Apply to the right trapezius muscle by the neck. (Patient not taking: Reported on 4/16/2025) 30 patch 0    oxybutynin ER 10 MG Oral Tablet 24 Hr Take 1 tablet (10 mg total) by mouth daily. (Patient not taking: Reported on 4/16/2025) 90 tablet 3    Ferrous Sulfate 325 (65 Fe) MG Oral Tab Take 1 tablet (325 mg total) by mouth daily with breakfast. (Patient not taking: Reported on 4/16/2025) 30 tablet 0   [6]   Allergies  Allergen Reactions    Oseltamivir Tightness in Throat

## 2025-04-15 NOTE — TELEPHONE ENCOUNTER
Dr. Osborne    *ACKNOWLEDGE BUTTON HAS BEEN MOVED TO THE TOP RIGHT RIBBON*    Please sign off on form if you agree to: disability for influenza    -Signature page will be the first page scanned  -From your Inbasket, Highlight the patient and click Chart   -Double click the 4/8/2025 Forms Completion telephone encounter  -Scroll down to the Media section   -Click the blue Hyperlink: disability Dr. Osborne 4/15/2025  -Click Acknowledge located in the top right ribbon/menu   -Drag the mouse into the blank space of the document and a + sign will appear. Left click to   electronically sign the document.  -Once signed, simply exit out of the screen and you signature will be saved.     Thank you,    Peyton

## 2025-04-16 ENCOUNTER — OFFICE VISIT (OUTPATIENT)
Dept: GASTROENTEROLOGY | Facility: CLINIC | Age: 55
End: 2025-04-16

## 2025-04-16 ENCOUNTER — TELEPHONE (OUTPATIENT)
Dept: GASTROENTEROLOGY | Facility: CLINIC | Age: 55
End: 2025-04-16

## 2025-04-16 VITALS
SYSTOLIC BLOOD PRESSURE: 116 MMHG | DIASTOLIC BLOOD PRESSURE: 67 MMHG | HEART RATE: 66 BPM | WEIGHT: 123 LBS | HEIGHT: 62 IN | BODY MASS INDEX: 22.63 KG/M2

## 2025-04-16 DIAGNOSIS — R19.7 DIARRHEA OF PRESUMED INFECTIOUS ORIGIN: Primary | ICD-10-CM

## 2025-04-16 DIAGNOSIS — R19.7 DIARRHEA, UNSPECIFIED TYPE: Primary | ICD-10-CM

## 2025-04-16 DIAGNOSIS — R63.4 WEIGHT LOSS: ICD-10-CM

## 2025-04-16 DIAGNOSIS — R10.13 EPIGASTRIC PAIN: ICD-10-CM

## 2025-04-16 PROCEDURE — 99214 OFFICE O/P EST MOD 30 MIN: CPT | Performed by: NURSE PRACTITIONER

## 2025-04-16 PROCEDURE — 3008F BODY MASS INDEX DOCD: CPT | Performed by: NURSE PRACTITIONER

## 2025-04-16 PROCEDURE — 3078F DIAST BP <80 MM HG: CPT | Performed by: NURSE PRACTITIONER

## 2025-04-16 PROCEDURE — 3074F SYST BP LT 130 MM HG: CPT | Performed by: NURSE PRACTITIONER

## 2025-04-16 RX ORDER — PANTOPRAZOLE SODIUM 40 MG/1
40 TABLET, DELAYED RELEASE ORAL DAILY
Qty: 30 TABLET | Refills: 3 | Status: SHIPPED | OUTPATIENT
Start: 2025-04-16 | End: 2025-04-17

## 2025-04-16 NOTE — TELEPHONE ENCOUNTER
Called [facility] at [phone], spoke with Bautista, no PA needed, reference number [id number]. Referral updated.

## 2025-04-16 NOTE — TELEPHONE ENCOUNTER
Scheduled for: EGD 05649   Provider Name: Dr. Cody   Date: Watsones 4/22/2025   [facility]   Sedation: MAC   Time: 8 am, (pt is aware that [facility] will call the day before to confirm arrival time)  Prep: Nothing after midnight the day before procedure and NPO 3 hrs prior procedure  Meds/Allergies Reconciled?: reviewed by provider   Diagnosis with codes: epigastric pain R10.13, weight loss R63.4, diarrhea R19.7    Was patient informed to call insurance with codes (Y/N):  Yes  Referral sent?: Yes, BCBS PPO  EM or [facility] notified?: Electronic case request was sent to [facility] via [employer].    Medication Orders: Patient is aware to NOT take iron pills, herbal meds and diet supplements for 7 days before exam. Also to NOT take any form of alcohol, recreational drugs 72 hrs and any forms of ED meds 24 hours before exam.     Misc Orders:       Further instructions given by staff: Instructions given in office and pt verbalized understanding          Egd w/ mac w/ Dr. Cody  Dx: diarrhea, epigastric pain, weight loss

## 2025-04-16 NOTE — PATIENT INSTRUCTIONS
-avoid nsaids  -reflux diet modification  -ct scan  -stool testing  -start pantoprazole AFTER stool testing  -continue to monitor for need for repeat cln earlier than recommended screening interval  -egd    Egd w/ zahra w/ Dr. Cody  Dx: diarrhea, epigastric pain, weight loss    Tips to Control Acid Reflux    To control acid reflux, you’ll need to make some basic diet and lifestyle changes. The simple steps outlined below may be all you’ll need to ease discomfort.   Watch what you eat  Don't have fatty foods or spicy foods.  Eat fewer acidic foods, such as citrus and tomato-based foods. These can increase symptoms.  Limit drinking alcohol, caffeine, and fizzy beverages. All increase acid reflux.  Try limiting chocolate, peppermint, and spearmint. These can make acid reflux worse in some people.     Watch when you eat  Don't lie down for 3 hours after eating.  Don't snack before going to bed.     Raise your head  Raising your head and upper body by 4 to 6 inches helps limit reflux when you’re lying down. Put blocks under the head of your bed frame or a wedge under your mattress to raise it.   Other changes  Lose weight, if you need to  Don’t exercise near bedtime  Don't wear tight-fitting clothes  Limit aspirin and ibuprofen  Stop smoking     [employer] last reviewed this educational content on 6/1/2019 © 2000-2020 [employer]. [address] [place] [address], [place] [address]. All rights reserved. This information is not intended as a substitute for professional medical care. Always follow your healthcare professional's instructions.

## 2025-04-17 ENCOUNTER — LAB ENCOUNTER (OUTPATIENT)
Dept: LAB | Age: 55
End: 2025-04-17
Attending: NURSE PRACTITIONER
Payer: COMMERCIAL

## 2025-04-17 ENCOUNTER — TELEPHONE (OUTPATIENT)
Facility: CLINIC | Age: 55
End: 2025-04-17

## 2025-04-17 DIAGNOSIS — R19.7 DIARRHEA, UNSPECIFIED TYPE: ICD-10-CM

## 2025-04-17 DIAGNOSIS — R10.13 EPIGASTRIC PAIN: ICD-10-CM

## 2025-04-17 PROCEDURE — 87338 HPYLORI STOOL AG IA: CPT

## 2025-04-17 PROCEDURE — 83993 ASSAY FOR CALPROTECTIN FECAL: CPT

## 2025-04-17 RX ORDER — OMEPRAZOLE 40 MG/1
40 CAPSULE, DELAYED RELEASE ORAL DAILY
Qty: 30 CAPSULE | Refills: 3 | Status: SHIPPED | OUTPATIENT
Start: 2025-04-17

## 2025-04-17 NOTE — TELEPHONE ENCOUNTER
Current Outpatient Medications   Medication Sig Dispense Refill    pantoprazole 40 MG Oral Tab EC Take 1 tablet (40 mg total) by mouth daily. (Patient not taking: Reported on 4/17/2025) 30 tablet 3

## 2025-04-17 NOTE — TELEPHONE ENCOUNTER
Lisa-    I called and spoke to the patient's pharmacy    Pharmacy states that pantoprazole 40 mg is not covered by the patient's insurance. They are requesting a prescription for either nexium 5 mg or omeprazole 40 mg be sent to the pharmacy instead    Please advise    Thank you

## 2025-04-18 NOTE — TELEPHONE ENCOUNTER
Lisa-    I called the patient's pharmacy to see if they received the omeprazole prescription    Per pharmacy staff, omeprazole 40 mg was received, but is now showing that it is not covered by insurance. The preferred medication is showing Nexium [facility] requesting this medication be sent to the pharmacy instead    Attempted to call the patient to notify her of the above, no answer    Left message to call back    Please advise    Thank you

## 2025-04-20 LAB
CALPROTECTIN STL-MCNT: 90.6 ΜG/G (ref ?–50)
H PYLORI AG STL QL IA: NEGATIVE

## 2025-04-21 ENCOUNTER — PATIENT MESSAGE (OUTPATIENT)
Dept: GASTROENTEROLOGY | Facility: CLINIC | Age: 55
End: 2025-04-21

## 2025-04-21 ENCOUNTER — TELEPHONE (OUTPATIENT)
Dept: GASTROENTEROLOGY | Facility: CLINIC | Age: 55
End: 2025-04-21

## 2025-04-21 DIAGNOSIS — R19.7 DIARRHEA, UNSPECIFIED TYPE: Primary | ICD-10-CM

## 2025-04-21 NOTE — TELEPHONE ENCOUNTER
Patient called to check status - informed patient disability form completed and awaiting signature from provider. Informed patient I would send another message \"high priority\" to provider. Patient verbalized understanding.

## 2025-04-21 NOTE — TELEPHONE ENCOUNTER
CLINICAL STAFF =please assists as well.     Patient calling and requesting to expedite the FMLA form completion, due date tomorrow 4/22/25 . Informed that it was already sent to DR Osborne for signing   and we are just waiting for it.   Per chart review,. Rupesh (RN ==see below ) already sent it to DR Osborne as high priority . ,

## 2025-04-22 PROBLEM — K31.9 GASTRIC ERYTHEMA: Status: ACTIVE | Noted: 2025-04-22

## 2025-04-22 PROCEDURE — 88305 TISSUE EXAM BY PATHOLOGIST: CPT | Performed by: INTERNAL MEDICINE

## 2025-04-22 PROCEDURE — 88312 SPECIAL STAINS GROUP 1: CPT | Performed by: INTERNAL MEDICINE

## 2025-04-22 NOTE — TELEPHONE ENCOUNTER
I called the patient's pharmacy to see if Nexium 40 mg is covered by the patient's insurance    Per pharmacist, the insurance will only cover nexium 5 mg    I called the patient's mobile number and patient's son Itzel answered    Itzel states the patient is currently sleeping and is requesting a [employer] message be sent to the patient    [employer] sent to the patient

## 2025-04-22 NOTE — TELEPHONE ENCOUNTER
I don't think nexium 5 mg would be sufficient for treatment of  her symptoms.  I would recommend nexium 40 mg if we are substituting. If not approved, advise prilosec otc 40 mg/daily.    Thanks,  Lisa

## 2025-04-24 ENCOUNTER — TELEPHONE (OUTPATIENT)
Facility: CLINIC | Age: 55
End: 2025-04-24

## 2025-04-25 NOTE — TELEPHONE ENCOUNTER
Dr. Cody,   Please review path and give recs. I spoke to son and instructed to continue PPI as directed and avoid NSAIDs.  Thanks,  Donna    Final Diagnosis:      A. Duodenum; biopsy:  Small bowel mucosa with no significant pathologic changes.  Preserved villous architecture.  No increase in intraepithelial lymphocytes.     B. Gastric biopsy:   Gastric mucosa with mild chronic inactive gastritis and focal intestinal metaplasia, complete type.  No dysplasia is identified.  Diff-Quik stain (with appropriate control reactivity) is negative for H. pylori microorganisms.

## 2025-04-29 ENCOUNTER — TELEPHONE (OUTPATIENT)
Facility: CLINIC | Age: 55
End: 2025-04-29

## 2025-04-29 NOTE — TELEPHONE ENCOUNTER
I contacted the pt by phone.   We discussed that stool testing show borderline stool calprotectin. Recommend repeat in 4 weeks. She verbalizes understanding and is in agreement with the plan.

## 2025-04-29 NOTE — TELEPHONE ENCOUNTER
Recall EGD for mapping in 1 year per Dr. Cody.     Last done 4/22/2025.     Recall entered into patient outreach for 4/22/2026.     Specialty comments updated.

## 2025-04-29 NOTE — TELEPHONE ENCOUNTER
----- Message from Maicol Cody sent at 4/28/2025  4:27 PM CDT -----  Repeat EGD in 1 year with mapping  ----- Message -----  From: Lab, Background User  Sent: 4/23/2025   9:42 AM CDT  To: Maicol Cody MD

## 2025-04-29 NOTE — TELEPHONE ENCOUNTER
Patient called stating mayank hasn't received forms, informed her we did not have Release of Information. Offered questionnaire, patient declined, will  forms at the office.

## 2025-05-13 ENCOUNTER — TELEPHONE (OUTPATIENT)
Dept: GASTROENTEROLOGY | Facility: CLINIC | Age: 55
End: 2025-05-13

## 2025-05-13 NOTE — TELEPHONE ENCOUNTER
Patient outreach message received:    Patient outreach updated for patient to complete calprotectin 5/17/2025 per Lisa Burnett APRN

## 2025-05-13 NOTE — TELEPHONE ENCOUNTER
[employer] interpretor # [id number] used to call the patient    I spoke to the patient    I reviewed the below note with the patient, she verbalized understanding    Patient has no further questions at this time

## 2025-05-16 ENCOUNTER — TELEPHONE (OUTPATIENT)
Facility: CLINIC | Age: 55
End: 2025-05-16

## 2025-05-16 NOTE — TELEPHONE ENCOUNTER
1st,overdue reminder letter mailed out to patient   Labs and Imaging order   Orders placed on 4/16/2025  CT ABDOMEN+PELVIS(CONTRAST ONLY)(CPT=74177)  Labs order  Calprotectin, Fecal (Order #[id number]) on 4/21/25      I have reviewed discharge instructions with the patient. The patient verbalized understanding. Discharge medications reviewed with patient and appropriate educational materials and side effects teaching were provided. Patient armband removed and shredded

## (undated) DEVICE — FORCEP RADIAL JAW 4

## (undated) DEVICE — KIT CLEAN ENDOKIT 1.1OZ GOWNX2

## (undated) DEVICE — 35 ML SYRINGE REGULAR TIP: Brand: MONOJECT

## (undated) DEVICE — MEDI-VAC NON-CONDUCTIVE SUCTION TUBING 6MM X 1.8M (6FT.) L: Brand: CARDINAL HEALTH

## (undated) DEVICE — KIT ENDO ORCAPOD 160/180/190

## (undated) DEVICE — LINE MNTR ADLT SET O2 INTMD

## (undated) NOTE — LETTER
8/9/2017              Wilman Daily        [id number]        [facility] [address]         Dear Emmons Degree,      It was a pleasure to see you at our [address], [facility] office.  Normal pap &  Negative high ris

## (undated) NOTE — LETTER
10/13/2022              Enrico Choudhury        [id number] [facility] [address]         To whom it may concern,    Enrico Choudhury is currently a patient under my medical care. I saw her today and she continues to have left shoulder pain. I think would be best for the next 6 weeks to at least put her in a different job position where she does not have to lift anything excessively heavy on a repetitive basis. She has weakness of her left arm when doing so and if we can restrict her weight limit to 5 pounds with the left arm and put her in a different position, that would be beneficial for her. I will follow-up with her in 6 weeks. If you require additional information please do not hesitate to contact my office.         Sincerely,     Maryuri Howe DO  [facility], [address], [address] [place] [address]  [phone]        Document electronically generated by:  Maryuri Howe DO

## (undated) NOTE — ED AVS SNAPSHOT
[facility] Emergency Department    [address]     [place] [address]    Phone:  [phone]    Fax:  [phone]           Levi Andersenosevelt   MRN: [id number]    Department:  [facility] Emergency Department   Date of Visit:  4/ Disclosure     Insurance plans vary and the physician(s) referred by the ER may not be covered by your plan. Please contact your insurance company to determine coverage and benefits available for follow-up care and referrals.       If you have difficulty sc prescription right away and begin taking the medication(s) as directed.   If you believe that any of the medications or instructions on this list is different from what your Primary Care doctor has instructed you - please continue to take your medications a Patient [address] to help you get signed up for insurance coverage. Patient [address] is a Federal Navigator program that can help with your Affordable Care Act coverage, as well as all types of Medicaid plans.   To get signed up and covere

## (undated) NOTE — ED AVS SNAPSHOT
[facility] Emergency Department    [address]     [place] [address]    Phone:  [phone]    Fax:  [phone]           Mary Bella   MRN: [id number]    Department:  [facility] Emergency Department   Date of Visit:  4/ and Class Registration line at [phone] or find a doctor online by visiting [email or web address].    IF THERE IS ANY CHANGE OR WORSENING OF YOUR CONDITION, CALL YOUR PRIMARY CARE PHYSICIAN AT ONCE OR RETURN IMMEDIATELY TO [address] [place] [address].     If

## (undated) NOTE — MR AVS SNAPSHOT
[address]  [address] [facility]  [phone]               Thank you for choosing us for your health care visit with Jose D Foster DO.   We are glad to serve you and happy to provide you with this summary requirements for authorization, please wait 5-7 days and then contact your physician's office. At that time, you will be provided with any authorization numbers or be assured that none are required. You can then schedule your appointment.  Failure to obtain If you have questions, you can call [phone] to talk to our [facility] Staff. Remember, [employer] is NOT to be used for urgent needs. For medical emergencies, dial 911.            Visit [facility] online at  [email or web address]

## (undated) NOTE — LETTER
5/16/2025          Vivian Coyne    [id number] [facility] [address]         Dear Vivian,    Our records indicate that the tests ordered for you by BORA Kay  have not been done.  If you have, in fact, already completed the tests or you do not wish to have the tests done, please contact our office at THE NUMBER LISTED BELOW.  Otherwise, please proceed with the testing.  Enclosed is a duplicate order for your convenience.  Labs and Imaging order   Orders placed on 4/16/2025  CT ABDOMEN+PELVIS(CONTRAST ONLY)(CPT=74177)  Labs order  Calprotectin, Fecal (Order #[id number]) on 4/21/25     To schedule a test at any [facility], call Central Scheduling at [phone], Monday through Friday between 7:30am to 6pm and on Saturday between 8am and 1pm.   Evening and weekend appointments for your exam are available.         Sincerely,    BORA Kay  [facility]  [address] [facility] [address]  [facility] [address]  [phone]

## (undated) NOTE — LETTER
3/31/2025          To Whom It May Concern:    Vivian Coyne is currently under my medical care and may not return to work at this time.    Please excuse Vivian for 6 days.  She may return to work on 04/03/2025.  Activity is restricted as follows: none.    If you require additional information please contact our office.        Sincerely,    BORA Crawford            Document generated by:  BORA Crawford

## (undated) NOTE — LETTER
2022              Danika Peña        [id number] [facility] [address]         To whom it may concern,    Danika Peña is currently a patient under my medical care. Patient was seen today for office visit. She will need 2 weeks off of work and can return back to work 2022 without restrictions. If you require additional information please do not hesitate to contact my office.         Sincerely,     Amada Delvalle DO  [facility], [address], [address] [place] [address]  [phone]        Document electronically generated by:  Amada Delvalle DO

## (undated) NOTE — LETTER
4/7/2025              Vivian Coyne        [id number] [facility] [address]         To whom it may concern,    Vivian Coyne is currently a patient under my medical care.  Patient was seen for illness and cannot return to work at this time.  She will return back to work on April 15, 2025 without restrictions.  If you require additional information please do not hesitate to contact my office.        Sincerely,     Hansel Osborne DO  [address] [address] [phone]        Document electronically generated by:  Hansel Osborne DO

## (undated) NOTE — LETTER
10/19/2023              Terrell Hernandez        [id number] [facility] [address]         To whom it may concern,    Terrell Hernandez is currently a patient under my medical care. Patient continues to be in pain. She will see me October 26, 2023 and needs to be off until then when I can reevaluate her and see if she can return back to work. If you require additional information please do not hesitate to contact my office.         Sincerely,     David Narvaez DO  [place] [id number] [phone]        Document electronically generated by:  David Narvaez DO

## (undated) NOTE — LETTER
3/28/2025          To Whom It May Concern:    Vivian Coyne is currently under my medical care and may not return to work at this time.    Please excuse Vivian for her work absence.  She may return to work on 3/31/2025  .  If you require additional information please contact our office.        Sincerely,      Shell Jeffrey MD          Document generated by:  Shell Jeffrey MD

## (undated) NOTE — MR AVS SNAPSHOT
[address]  [address] [facility]  [phone]               Thank you for choosing us for your health care visit with Corrie Hinds DO.   We are glad to serve you and happy to provide you with this summary No Known Allergies                Today's Vital Signs     BP Pulse Height Weight BMI Breastfeeding?     107/71 mmHg 66 5' 3\" (1.6 m) 135 lb (61.236 kg) 23.92 kg/m2 Unknown         Current Medications      Notice  As of 4/4/2017  2:50 PM    You have not be

## (undated) NOTE — LETTER
4/2/2025          To Whom It May Concern:    Vivian Coyne is currently under my medical care and may not return to work at this time.    Please excuse Vivian until 4/6/25.  She may return to work on 4/7/25.  Activity is restricted as follows: none.    If you require additional information please contact our office.        Sincerely,    BORA Crawford           Document generated by:  Alejandra STARKEY RN

## (undated) NOTE — LETTER
10/6/2023              Keaton Brandt        [id number] [facility] [address]         To whom it may concern,    Keaton Brandt is currently a patient under my medical care. Patient was seen today and due to her complaints she will need to be off of work for 2 weeks at which time I will reevaluate her and see if she is good enough to go back to work. She will see me on 10/20/2023 for reevaluation. If you require additional information please do not hesitate to contact my office.         Sincerely,     DO DEMI [facility] [address] [phone]        Document electronically generated by:  Char Vernon DO

## (undated) NOTE — LETTER
08/02/19        [address] [place] [address]      Dear [place],    [address] [facility] records indicate that you have outstanding lab work and or testing that was ordered for you and has not yet been completed:  Orders Placed This Encounter

## (undated) NOTE — LETTER
10/11/2022              Russell Driver        [id number] [facility] [address]         To whom it may concern,    Russell Driver is currently a patient under my medical care. Work restriction is no lifting over 5 pounds until reevaluated by me which will be on October 13, 2022. .  If you require additional information please do not hesitate to contact my office.         Sincerely,     Ted Villaseñor DO  [facility], [address] [facility], [address] [place] [address]  [phone]        Document electronically generated by:  Ted Villaseñor DO

## (undated) NOTE — LETTER
10/21/2022              Terrell Hernandez        [id number] [facility] [address]         To whom it may concern,    Terrell Hernandez is currently a patient under my medical care. Patient states she is doing much better and would like to return back to work without restrictions. She can return back to work at her next scheduled shift without restrictions. If you require additional information please do not hesitate to contact my office.         Sincerely,     David Narvaez DO  [facility], [address] [facility], [address] [place] [address]  [phone]        Document electronically generated by:  David Narvaez DO

## (undated) NOTE — ED AVS SNAPSHOT
[facility] AND [facility] in [facility] [address].  [address] [facility]    Phone:  [phone]    Fax:  [phone]           Velda Boxer   MRN: [id number]    Department:  [facility] AND [facility] in [address] [place] [address]   Date of Visit times a day for 5-10 minutes at a time. Follow-up with your primary physician in 2 days as scheduled for wound check and packing removal.  Keep dressing in place until your follow-up.     Discharge References/Attachments     ABSCESS, INCISION AND DRAINAGE that Physician.   If you need additional assistance selecting a physician, you may call the [facility] Physician Referral and Class Registration line at [phone] or find a doctor online by visiting [email or web address].    IF THERE IS ANY ERICKSON [facility] [address]  [place] [address]) [phone]                Additional Information       We are concerned for your overall well being:    - If you are a smoker or have smoked in the last 12 months, we encourage you to explore op

## (undated) NOTE — LETTER
11/14/2023              Sai Flores        [id number] [facility] [address]         To whom it may concern,    Sai Flores is currently a patient under my medical care. Patient continues to have discomfort in her arm and will be unable to work for the next 2 weeks. She can return back to work on November 29, 2023. I have told her she needs to see a specialist and gave her a referral due to her continued pain. If you require additional information please do not hesitate to contact my office.         Sincerely,     Yolanda Leon DO [address] [address] [phone]        Document electronically generated by:  Yolanda Leon DO

## (undated) NOTE — Clinical Note
April 12, 2017     Cindy Franks      Dear Candis Lyle:    Below are the results from your recent visit: Mammogram read as benign.       Resulted Orders   RAMYA SCREENING BILAT (ENI=[id number])    Narrative    PROCEDURE

## (undated) NOTE — LETTER
10/26/2023              Mariel Lao        [id number] [facility] [address]         To whom it may concern,    Mariel Lao is currently a patient under my medical care. Patient is still is in quite a bit of pain in the right trapezius muscle that goes to the right arm. She states lifting and pushing aggravate this area. She would like another 2 weeks off but I told her she does need to see a pain specialist and I did a referral..  She should be able to go back to work on November 9, 2023 unless the pain specialist feels otherwise. If you require additional information please do not hesitate to contact my office.         Sincerely,     Magno Shannon DO  [address] [address] [phone]        Document electronically generated by:  Magno Shannon DO

## (undated) NOTE — LETTER
Date & Time: 11/3/2021, 4:53 PM  Patient: Sharlene Dancer  Encounter Provider(s):    BORA Crenshaw       To Whom It May Concern:    Sharlene Dancer was seen and treated in our department on 11/3/2021.  She should not return to work until 11/06/

## (undated) NOTE — Clinical Note
Faisal Benitez - I saw Batooltan today with mixed UI. I've recommended bladder testing. I will work to manage her sx. I appreciate the opportunity to participate in her care.  Thanks, [employer]

## (undated) NOTE — LETTER
6/10/2024          To Whom It May Concern:    Vivian Coyne is currently under medical care by my collaborating physician Shell Jeffrey and may return to work at this time.    Please excuse Vivian for 1 days missed for appointment with MD Jeffrey for acute visit on 6/3/24.  She may return to work following.  Activity is restricted as follows: none.    If you require additional information please contact our office.        Sincerely,    BORA Valera            Document generated by:  BORA Valera

## (undated) NOTE — LETTER
8/1/2024              Vivian Coyne        [id number] [facility] [address]         To whom it may concern,    Vivian Coyne is currently a patient under my medical care.  Patient sustained a work injury to her low back.  She was seen on July 30, 2024.  Because she is still in pain she will be off work at this time and then return back to work on August 9, 2024 full duty without restrictions.  Her x-ray showed no fractures of her back.  If you require additional information please do not hesitate to contact my office.        Sincerely,     Hansel Osborne DO  [address] [address] [phone]        Document electronically generated by:  Hansel Osborne DO